# Patient Record
Sex: MALE | Race: WHITE | Employment: OTHER | URBAN - METROPOLITAN AREA
[De-identification: names, ages, dates, MRNs, and addresses within clinical notes are randomized per-mention and may not be internally consistent; named-entity substitution may affect disease eponyms.]

---

## 2021-05-27 PROBLEM — C43.59 MALIGNANT MELANOMA OF UPPER BACK (HCC): Status: ACTIVE | Noted: 2021-05-27

## 2021-06-01 ENCOUNTER — HOSPITAL ENCOUNTER (OUTPATIENT)
Dept: GENERAL RADIOLOGY | Age: 86
Discharge: HOME | End: 2021-06-01
Attending: SURGERY
Payer: MEDICARE

## 2021-06-01 ENCOUNTER — HOSPITAL ENCOUNTER (OUTPATIENT)
Dept: SURGERY | Age: 86
Discharge: HOME | End: 2021-06-01
Payer: MEDICARE

## 2021-06-01 VITALS
TEMPERATURE: 98.6 F | HEIGHT: 71 IN | HEART RATE: 62 BPM | DIASTOLIC BLOOD PRESSURE: 74 MMHG | SYSTOLIC BLOOD PRESSURE: 160 MMHG | OXYGEN SATURATION: 95 % | BODY MASS INDEX: 26.49 KG/M2 | WEIGHT: 189.2 LBS | RESPIRATION RATE: 18 BRPM

## 2021-06-01 LAB
ALBUMIN SERPL-MCNC: 3.3 G/DL (ref 3.2–4.6)
ALBUMIN/GLOB SERPL: 0.9 {RATIO} (ref 1.2–3.5)
ALP SERPL-CCNC: 96 U/L (ref 50–136)
ALT SERPL-CCNC: 26 U/L (ref 12–65)
ANION GAP SERPL CALC-SCNC: 5 MMOL/L (ref 7–16)
AST SERPL-CCNC: 15 U/L (ref 15–37)
ATRIAL RATE: 60 BPM
BASOPHILS # BLD: 0.1 K/UL (ref 0–0.2)
BASOPHILS NFR BLD: 1 % (ref 0–2)
BILIRUB SERPL-MCNC: 0.6 MG/DL (ref 0.2–1.1)
BUN SERPL-MCNC: 19 MG/DL (ref 8–23)
CALCIUM SERPL-MCNC: 8.9 MG/DL (ref 8.3–10.4)
CALCULATED R AXIS, ECG10: -38 DEGREES
CALCULATED T AXIS, ECG11: 57 DEGREES
CHLORIDE SERPL-SCNC: 106 MMOL/L (ref 98–107)
CO2 SERPL-SCNC: 28 MMOL/L (ref 21–32)
CREAT SERPL-MCNC: 0.87 MG/DL (ref 0.8–1.5)
DIAGNOSIS, 93000: NORMAL
DIFFERENTIAL METHOD BLD: ABNORMAL
EOSINOPHIL # BLD: 0.2 K/UL (ref 0–0.8)
EOSINOPHIL NFR BLD: 1 % (ref 0.5–7.8)
ERYTHROCYTE [DISTWIDTH] IN BLOOD BY AUTOMATED COUNT: 14.6 % (ref 11.9–14.6)
GLOBULIN SER CALC-MCNC: 3.6 G/DL (ref 2.3–3.5)
GLUCOSE SERPL-MCNC: 115 MG/DL (ref 65–100)
HCT VFR BLD AUTO: 41.2 % (ref 41.1–50.3)
HGB BLD-MCNC: 13.1 G/DL (ref 13.6–17.2)
IMM GRANULOCYTES # BLD AUTO: 0.1 K/UL (ref 0–0.5)
IMM GRANULOCYTES NFR BLD AUTO: 1 % (ref 0–5)
LYMPHOCYTES # BLD: 1.7 K/UL (ref 0.5–4.6)
LYMPHOCYTES NFR BLD: 14 % (ref 13–44)
MCH RBC QN AUTO: 30.1 PG (ref 26.1–32.9)
MCHC RBC AUTO-ENTMCNC: 31.8 G/DL (ref 31.4–35)
MCV RBC AUTO: 94.7 FL (ref 79.6–97.8)
MONOCYTES # BLD: 1.1 K/UL (ref 0.1–1.3)
MONOCYTES NFR BLD: 9 % (ref 4–12)
NEUTS SEG # BLD: 9.2 K/UL (ref 1.7–8.2)
NEUTS SEG NFR BLD: 74 % (ref 43–78)
NRBC # BLD: 0 K/UL (ref 0–0.2)
P-R INTERVAL, ECG05: 192 MS
PLATELET # BLD AUTO: 210 K/UL (ref 150–450)
PMV BLD AUTO: 9.9 FL (ref 9.4–12.3)
POTASSIUM SERPL-SCNC: 3.7 MMOL/L (ref 3.5–5.1)
PROT SERPL-MCNC: 6.9 G/DL (ref 6.3–8.2)
Q-T INTERVAL, ECG07: 462 MS
QRS DURATION, ECG06: 164 MS
QTC CALCULATION (BEZET), ECG08: 462 MS
RBC # BLD AUTO: 4.35 M/UL (ref 4.23–5.6)
SODIUM SERPL-SCNC: 139 MMOL/L (ref 138–145)
VENTRICULAR RATE, ECG03: 60 BPM
WBC # BLD AUTO: 12.4 K/UL (ref 4.3–11.1)

## 2021-06-01 PROCEDURE — 93005 ELECTROCARDIOGRAM TRACING: CPT | Performed by: ANESTHESIOLOGY

## 2021-06-01 PROCEDURE — 71046 X-RAY EXAM CHEST 2 VIEWS: CPT

## 2021-06-01 PROCEDURE — 80053 COMPREHEN METABOLIC PANEL: CPT

## 2021-06-01 PROCEDURE — 85025 COMPLETE CBC W/AUTO DIFF WBC: CPT

## 2021-06-01 RX ORDER — PREDNISONE 1 MG/1
4 TABLET ORAL
COMMUNITY

## 2021-06-01 RX ORDER — ASPIRIN 81 MG/1
81 TABLET ORAL DAILY
COMMUNITY

## 2021-06-01 RX ORDER — ACETAMINOPHEN 500 MG
1000 TABLET ORAL
COMMUNITY

## 2021-06-01 NOTE — PERIOP NOTES
PLEASE CONTINUE TAKING ALL PRESCRIPTION MEDICATIONS UP TO THE DAY OF SURGERY UNLESS OTHERWISE DIRECTED BELOW. DISCONTINUE all vitamins and supplements now for surgery. DISCONTINUE Non-Steriodal Anti-Inflammatory (NSAIDS) such as Advil and Aleve now for  surgery. Home Medications to take  the day of surgery    fexofenadine- allegra   Albuterol inhaler use if needed and bring to the hospital   Metoprolol    Prednisone    Aspirin 81 mg    Esomeprazole- nexium    Sertraline-zoloft     Home Medications   to Hold   Vitamins, Supplements, and Herbals. Non-Steriodal Anti-Inflammatory (NSAIDS) such as Advil and Aleve. Hold the morning of surgery cozaar (losartan),      Hold eliquis as instructed by surgeon and cardiologist     Comments      On the day before surgery please take Acetaminophen 1000mg in the morning and then again before bed. You may substitute for Tylenol 650 mg. Please do not bring home medications with you on the day of surgery unless otherwise directed by your nurse. If you are instructed to bring home medications, please give them to your nurse as they will be administered by the nursing staff. If you have any questions, please call [facility] Traci Kelly [phone] or [facility] [phone]. A copy of this note was provided to the patient for reference.

## 2021-06-01 NOTE — PERIOP NOTES
Recent Results (from the past 12 hour(s))   CBC WITH AUTOMATED DIFF    Collection Time: 06/01/21 11:35 AM   Result Value Ref Range    WBC 12.4 (H) 4.3 - 11.1 K/uL    RBC 4.35 4.23 - 5.6 M/uL    HGB 13.1 (L) 13.6 - 17.2 g/dL    HCT 41.2 41.1 - 50.3 %    MCV 94.7 79.6 - 97.8 FL    MCH 30.1 26.1 - 32.9 PG    MCHC 31.8 31.4 - 35.0 g/dL    RDW 14.6 11.9 - 14.6 %    PLATELET 642 907 - 495 K/uL    MPV 9.9 9.4 - 12.3 FL    ABSOLUTE NRBC 0.00 0.0 - 0.2 K/uL    DF AUTOMATED      NEUTROPHILS 74 43 - 78 %    LYMPHOCYTES 14 13 - 44 %    MONOCYTES 9 4.0 - 12.0 %    EOSINOPHILS 1 0.5 - 7.8 %    BASOPHILS 1 0.0 - 2.0 %    IMMATURE GRANULOCYTES 1 0.0 - 5.0 %    ABS. NEUTROPHILS 9.2 (H) 1.7 - 8.2 K/UL    ABS. LYMPHOCYTES 1.7 0.5 - 4.6 K/UL    ABS. MONOCYTES 1.1 0.1 - 1.3 K/UL    ABS. EOSINOPHILS 0.2 0.0 - 0.8 K/UL    ABS. BASOPHILS 0.1 0.0 - 0.2 K/UL    ABS. IMM. GRANS. 0.1 0.0 - 0.5 K/UL   METABOLIC PANEL, COMPREHENSIVE    Collection Time: 06/01/21 11:35 AM   Result Value Ref Range    Sodium 139 138 - 145 mmol/L    Potassium 3.7 3.5 - 5.1 mmol/L    Chloride 106 98 - 107 mmol/L    CO2 28 21 - 32 mmol/L    Anion gap 5 (L) 7 - 16 mmol/L    Glucose 115 (H) 65 - 100 mg/dL    BUN 19 8 - 23 MG/DL    Creatinine 0.87 0.8 - 1.5 MG/DL    GFR est AA >60 >60 ml/min/1.73m2    GFR est non-AA >60 >60 ml/min/1.73m2    Calcium 8.9 8.3 - 10.4 MG/DL    Bilirubin, total 0.6 0.2 - 1.1 MG/DL    ALT (SGPT) 26 12 - 65 U/L    AST (SGOT) 15 15 - 37 U/L    Alk.  phosphatase 96 50 - 136 U/L    Protein, total 6.9 6.3 - 8.2 g/dL    Albumin 3.3 3.2 - 4.6 g/dL    Globulin 3.6 (H) 2.3 - 3.5 g/dL    A-G Ratio 0.9 (L) 1.2 - 3.5     EKG, 12 LEAD, INITIAL    Collection Time: 06/01/21 11:53 AM   Result Value Ref Range    Ventricular Rate 60 BPM    Atrial Rate 60 BPM    P-R Interval 192 ms    QRS Duration 164 ms    Q-T Interval 462 ms    QTC Calculation (Bezet) 462 ms    Calculated R Axis -38 degrees    Calculated T Axis 57 degrees    Diagnosis       AV dual-paced rhythm  Abnormal ECG  When compared with ECG of 26-OCT-2007 13:45,  Electronic ventricular pacemaker has replaced Sinus rhythm       Reviewed.   CXR: impression- no evidence of metastatic disease in chest.  [facility], WBC 12.4

## 2021-06-01 NOTE — PERIOP NOTES
Vanna in scheduling notified to add pacemaker to case posting. Records received from [facility]. Dr. Zayda Whatley notified of procedure;  pacemaker interrogation 1/19/2021- [facility]emaker for sick sinus rhythm, not pacer dependent; history of a AVR and CABG 2012. Per Dr. Zayda Whatley a pacer rep is not needed.

## 2021-06-01 NOTE — PERIOP NOTES
Patient confirms name and . Order to obtain consent  found in EHR and  matches case posting. Pt verbalizes understanding of 1 visitor policy. Type 2 surgery, walk-in PAT assessment complete. Labs per surgeon: CBC, CMP. CXR, patient's ID bracelet applied, patient was given the order for the CXR and instructed to go to the radiology department and have CXR completed before leaving the hospital today. Labs per anesthesia protocol: Hgb included in CBC  EK21   Glucose: not indicated  Echo: 2019  EF 55-65%    Patient has a [address] pacemaker, records requested from [facility]    Covid test not ordered. Patient completed Denise Ding 2nd dose 21, covid vaccine card scanned under media tab    Medication list  visualized today. Antibacterial soap and Hibiclens and instructions given per hospital policy. Use in shower the night before surgery and on the morning of surgery. Patient provided with and instructed on educational handouts including Guide to Surgery, Pain Management, Hand Hygiene, Blood Transfusion Education, and [facility] Brochure. Medication instructions provided per PTA medication instruction note. Patient provided with a copy. Patient and his wife stated that they did not receive instructions about when to hold eliquis. Message left for Justyna at Dr. Alfredo Fox office to please contact the patient regarding eliquis. Update medication clearance (Dr. Dee Dee Dorsey)  for eliquis in EHR. Patient answered medical/surgical history questions at their best of ability. All prior to admission medications documented in [facility]. Patient instructed on the following:  Arrive at main Entrance, time of arrival to be called the day before by 1700  NPO after midnight including gum, mints, and ice chips. Responsible adult must drive patient to the hospital, stay during surgery, and patient will require supervision 24 hours after anesthesia.  If admitted to hospital, current visiting 6:30 a.m. - 9 p.m. for adult visitors. Leave all valuables (money and jewelry) at home but bring insurance card and ID on DOS. Do not wear make-up, nail polish, lotions, cologne, perfumes, powders, or oil on skin. Patient teach back successful and patient demonstrates knowledge of instruction.

## 2021-06-03 ENCOUNTER — ANESTHESIA EVENT (OUTPATIENT)
Dept: SURGERY | Age: 86
End: 2021-06-03
Payer: MEDICARE

## 2021-06-03 ENCOUNTER — HOSPITAL ENCOUNTER (OUTPATIENT)
Dept: NUCLEAR MEDICINE | Age: 86
Discharge: HOME | End: 2021-06-03
Attending: SURGERY
Payer: MEDICARE

## 2021-06-03 DIAGNOSIS — C43.59 MALIGNANT MELANOMA OF UPPER BACK (HCC): ICD-10-CM

## 2021-06-03 PROCEDURE — 78195 LYMPH SYSTEM IMAGING: CPT

## 2021-06-03 NOTE — ANESTHESIA PREPROCEDURE EVALUATION
Relevant Problems   RESPIRATORY SYSTEM   (+) Chronic obstructive pulmonary disease (HCC)      CARDIOVASCULAR   (+) Hypertension   (+) Presence of cardiac pacemaker   (+) Sick sinus syndrome (HCC)      PERSONAL HX & FAMILY HX OF CANCER   (+) Malignant melanoma of upper back (HCC)       Anesthetic History               Review of Systems / Medical History  Patient summary reviewed and pertinent labs reviewed    Pulmonary    COPD        Asthma : well controlled       Neuro/Psych              Cardiovascular    Hypertension        Dysrhythmias (SSS, Afib)   Pacemaker and CAD    Exercise tolerance: >4 METS  Comments: Pacemaker DDDR  Echo: Preserved EF, Bioprosthetic AV wnl   GI/Hepatic/Renal  Within defined limits              Endo/Other        Arthritis     Other Findings            Physical Exam    Airway  Mallampati: II  TM Distance: 4 - 6 cm  Neck ROM: normal range of motion   Mouth opening: Normal     Cardiovascular    Rhythm: regular  Rate: normal         Dental  No notable dental hx       Pulmonary  Breath sounds clear to auscultation               Abdominal  GI exam deferred       Other Findings            Anesthetic Plan    ASA: 3  Anesthesia type: general          Induction: Intravenous  Anesthetic plan and risks discussed with: Patient

## 2021-06-04 ENCOUNTER — APPOINTMENT (OUTPATIENT)
Dept: NUCLEAR MEDICINE | Age: 86
End: 2021-06-04
Attending: SURGERY
Payer: MEDICARE

## 2021-06-04 ENCOUNTER — ANESTHESIA (OUTPATIENT)
Dept: SURGERY | Age: 86
End: 2021-06-04
Payer: MEDICARE

## 2021-06-04 ENCOUNTER — HOSPITAL ENCOUNTER (OUTPATIENT)
Age: 86
Setting detail: OUTPATIENT SURGERY
Discharge: HOME | End: 2021-06-04
Attending: SURGERY | Admitting: SURGERY
Payer: MEDICARE

## 2021-06-04 VITALS
OXYGEN SATURATION: 92 % | BODY MASS INDEX: 27.11 KG/M2 | HEIGHT: 70 IN | WEIGHT: 189.4 LBS | TEMPERATURE: 98.2 F | HEART RATE: 63 BPM | DIASTOLIC BLOOD PRESSURE: 72 MMHG | RESPIRATION RATE: 16 BRPM | SYSTOLIC BLOOD PRESSURE: 189 MMHG

## 2021-06-04 DIAGNOSIS — C43.59 MALIGNANT MELANOMA OF UPPER BACK (HCC): Primary | ICD-10-CM

## 2021-06-04 PROCEDURE — 77030006627 HC BLD GRFT DRMTO ZIMM -B: Performed by: SURGERY

## 2021-06-04 PROCEDURE — 88305 TISSUE EXAM BY PATHOLOGIST: CPT

## 2021-06-04 PROCEDURE — 77030019908 HC STETH ESOPH SIMS -A: Performed by: STUDENT IN AN ORGANIZED HEALTH CARE EDUCATION/TRAINING PROGRAM

## 2021-06-04 PROCEDURE — 21936 RESECT BACK TUM 5 CM/>: CPT | Performed by: SURGERY

## 2021-06-04 PROCEDURE — 88307 TISSUE EXAM BY PATHOLOGIST: CPT

## 2021-06-04 PROCEDURE — 38900 IO MAP OF SENT LYMPH NODE: CPT | Performed by: SURGERY

## 2021-06-04 PROCEDURE — 38525 BIOPSY/REMOVAL LYMPH NODES: CPT | Performed by: SURGERY

## 2021-06-04 PROCEDURE — 74011000250 HC RX REV CODE- 250: Performed by: STUDENT IN AN ORGANIZED HEALTH CARE EDUCATION/TRAINING PROGRAM

## 2021-06-04 PROCEDURE — 77030002916 HC SUT ETHLN J&J -A: Performed by: SURGERY

## 2021-06-04 PROCEDURE — 77030003029 HC SUT VCRL J&J -B: Performed by: SURGERY

## 2021-06-04 PROCEDURE — 77030011256 HC DRSG MEPILEX <16IN NO BORD MOLN -A: Performed by: SURGERY

## 2021-06-04 PROCEDURE — 76060000034 HC ANESTHESIA 1.5 TO 2 HR: Performed by: SURGERY

## 2021-06-04 PROCEDURE — 74011250636 HC RX REV CODE- 250/636: Performed by: ANESTHESIOLOGY

## 2021-06-04 PROCEDURE — 74011000250 HC RX REV CODE- 250: Performed by: SURGERY

## 2021-06-04 PROCEDURE — 76010000153 HC OR TIME 1.5 TO 2 HR: Performed by: SURGERY

## 2021-06-04 PROCEDURE — 77030039425 HC BLD LARYNG TRULITE DISP TELE -A: Performed by: STUDENT IN AN ORGANIZED HEALTH CARE EDUCATION/TRAINING PROGRAM

## 2021-06-04 PROCEDURE — 77030037088 HC TUBE ENDOTRACH ORAL NSL COVD-A: Performed by: STUDENT IN AN ORGANIZED HEALTH CARE EDUCATION/TRAINING PROGRAM

## 2021-06-04 PROCEDURE — 76210000063 HC OR PH I REC FIRST 0.5 HR: Performed by: SURGERY

## 2021-06-04 PROCEDURE — 77030008462 HC STPLR SKN PROX J&J -A: Performed by: SURGERY

## 2021-06-04 PROCEDURE — 74011250636 HC RX REV CODE- 250/636: Performed by: SURGERY

## 2021-06-04 PROCEDURE — 76210000026 HC REC RM PH II 1 TO 1.5 HR: Performed by: SURGERY

## 2021-06-04 PROCEDURE — 2709999900 HC NON-CHARGEABLE SUPPLY: Performed by: SURGERY

## 2021-06-04 PROCEDURE — 74011250636 HC RX REV CODE- 250/636: Performed by: STUDENT IN AN ORGANIZED HEALTH CARE EDUCATION/TRAINING PROGRAM

## 2021-06-04 PROCEDURE — 77030010512 HC APPL CLP LIG J&J -C: Performed by: SURGERY

## 2021-06-04 PROCEDURE — 77030040922 HC BLNKT HYPOTHRM STRY -A: Performed by: STUDENT IN AN ORGANIZED HEALTH CARE EDUCATION/TRAINING PROGRAM

## 2021-06-04 RX ORDER — DIPHENHYDRAMINE HYDROCHLORIDE 50 MG/ML
12.5 INJECTION, SOLUTION INTRAMUSCULAR; INTRAVENOUS ONCE
Status: DISCONTINUED | OUTPATIENT
Start: 2021-06-04 | End: 2021-06-04 | Stop reason: HOSPADM

## 2021-06-04 RX ORDER — FENTANYL CITRATE 50 UG/ML
INJECTION, SOLUTION INTRAMUSCULAR; INTRAVENOUS AS NEEDED
Status: DISCONTINUED | OUTPATIENT
Start: 2021-06-04 | End: 2021-06-04 | Stop reason: HOSPADM

## 2021-06-04 RX ORDER — ACETAMINOPHEN 500 MG
1000 TABLET ORAL ONCE
Status: DISCONTINUED | OUTPATIENT
Start: 2021-06-04 | End: 2021-06-04 | Stop reason: HOSPADM

## 2021-06-04 RX ORDER — DEXAMETHASONE SODIUM PHOSPHATE 4 MG/ML
INJECTION, SOLUTION INTRA-ARTICULAR; INTRALESIONAL; INTRAMUSCULAR; INTRAVENOUS; SOFT TISSUE AS NEEDED
Status: DISCONTINUED | OUTPATIENT
Start: 2021-06-04 | End: 2021-06-04 | Stop reason: HOSPADM

## 2021-06-04 RX ORDER — NALOXONE HYDROCHLORIDE 0.4 MG/ML
0.1 INJECTION, SOLUTION INTRAMUSCULAR; INTRAVENOUS; SUBCUTANEOUS AS NEEDED
Status: DISCONTINUED | OUTPATIENT
Start: 2021-06-04 | End: 2021-06-04 | Stop reason: HOSPADM

## 2021-06-04 RX ORDER — FENTANYL CITRATE 50 UG/ML
100 INJECTION, SOLUTION INTRAMUSCULAR; INTRAVENOUS ONCE
Status: DISCONTINUED | OUTPATIENT
Start: 2021-06-04 | End: 2021-06-04 | Stop reason: HOSPADM

## 2021-06-04 RX ORDER — HYDROMORPHONE HYDROCHLORIDE 1 MG/ML
0.5 INJECTION, SOLUTION INTRAMUSCULAR; INTRAVENOUS; SUBCUTANEOUS
Status: DISCONTINUED | OUTPATIENT
Start: 2021-06-04 | End: 2021-06-04 | Stop reason: HOSPADM

## 2021-06-04 RX ORDER — SODIUM CHLORIDE, SODIUM LACTATE, POTASSIUM CHLORIDE, CALCIUM CHLORIDE 600; 310; 30; 20 MG/100ML; MG/100ML; MG/100ML; MG/100ML
100 INJECTION, SOLUTION INTRAVENOUS CONTINUOUS
Status: DISCONTINUED | OUTPATIENT
Start: 2021-06-04 | End: 2021-06-04 | Stop reason: HOSPADM

## 2021-06-04 RX ORDER — SODIUM CHLORIDE, SODIUM LACTATE, POTASSIUM CHLORIDE, CALCIUM CHLORIDE 600; 310; 30; 20 MG/100ML; MG/100ML; MG/100ML; MG/100ML
75 INJECTION, SOLUTION INTRAVENOUS CONTINUOUS
Status: DISCONTINUED | OUTPATIENT
Start: 2021-06-04 | End: 2021-06-04 | Stop reason: HOSPADM

## 2021-06-04 RX ORDER — ONDANSETRON 2 MG/ML
4 INJECTION INTRAMUSCULAR; INTRAVENOUS ONCE
Status: DISCONTINUED | OUTPATIENT
Start: 2021-06-04 | End: 2021-06-04 | Stop reason: HOSPADM

## 2021-06-04 RX ORDER — CEFAZOLIN SODIUM/WATER 2 G/20 ML
2 SYRINGE (ML) INTRAVENOUS ONCE
Status: COMPLETED | OUTPATIENT
Start: 2021-06-04 | End: 2021-06-04

## 2021-06-04 RX ORDER — LIDOCAINE HYDROCHLORIDE 10 MG/ML
0.1 INJECTION INFILTRATION; PERINEURAL AS NEEDED
Status: DISCONTINUED | OUTPATIENT
Start: 2021-06-04 | End: 2021-06-04 | Stop reason: HOSPADM

## 2021-06-04 RX ORDER — OXYCODONE HYDROCHLORIDE 5 MG/1
5 TABLET ORAL
Status: DISCONTINUED | OUTPATIENT
Start: 2021-06-04 | End: 2021-06-04 | Stop reason: HOSPADM

## 2021-06-04 RX ORDER — HYDROCODONE BITARTRATE AND ACETAMINOPHEN 5; 325 MG/1; MG/1
1-2 TABLET ORAL
Qty: 28 TABLET | Refills: 0 | Status: SHIPPED | OUTPATIENT
Start: 2021-06-04 | End: 2021-06-11

## 2021-06-04 RX ORDER — PROPOFOL 10 MG/ML
INJECTION, EMULSION INTRAVENOUS AS NEEDED
Status: DISCONTINUED | OUTPATIENT
Start: 2021-06-04 | End: 2021-06-04 | Stop reason: HOSPADM

## 2021-06-04 RX ORDER — ONDANSETRON 2 MG/ML
INJECTION INTRAMUSCULAR; INTRAVENOUS AS NEEDED
Status: DISCONTINUED | OUTPATIENT
Start: 2021-06-04 | End: 2021-06-04 | Stop reason: HOSPADM

## 2021-06-04 RX ORDER — OXYCODONE HYDROCHLORIDE 5 MG/1
10 TABLET ORAL
Status: DISCONTINUED | OUTPATIENT
Start: 2021-06-04 | End: 2021-06-04 | Stop reason: HOSPADM

## 2021-06-04 RX ORDER — SODIUM CHLORIDE 0.9 % (FLUSH) 0.9 %
5-40 SYRINGE (ML) INJECTION AS NEEDED
Status: DISCONTINUED | OUTPATIENT
Start: 2021-06-04 | End: 2021-06-04 | Stop reason: HOSPADM

## 2021-06-04 RX ORDER — GLYCOPYRROLATE 0.2 MG/ML
INJECTION INTRAMUSCULAR; INTRAVENOUS AS NEEDED
Status: DISCONTINUED | OUTPATIENT
Start: 2021-06-04 | End: 2021-06-04 | Stop reason: HOSPADM

## 2021-06-04 RX ORDER — BUPIVACAINE HYDROCHLORIDE 5 MG/ML
INJECTION, SOLUTION EPIDURAL; INTRACAUDAL AS NEEDED
Status: DISCONTINUED | OUTPATIENT
Start: 2021-06-04 | End: 2021-06-04 | Stop reason: HOSPADM

## 2021-06-04 RX ORDER — NEOSTIGMINE METHYLSULFATE 1 MG/ML
INJECTION, SOLUTION INTRAVENOUS AS NEEDED
Status: DISCONTINUED | OUTPATIENT
Start: 2021-06-04 | End: 2021-06-04 | Stop reason: HOSPADM

## 2021-06-04 RX ORDER — MIDAZOLAM HYDROCHLORIDE 1 MG/ML
2 INJECTION, SOLUTION INTRAMUSCULAR; INTRAVENOUS
Status: DISCONTINUED | OUTPATIENT
Start: 2021-06-04 | End: 2021-06-04 | Stop reason: HOSPADM

## 2021-06-04 RX ORDER — ROCURONIUM BROMIDE 10 MG/ML
INJECTION, SOLUTION INTRAVENOUS AS NEEDED
Status: DISCONTINUED | OUTPATIENT
Start: 2021-06-04 | End: 2021-06-04 | Stop reason: HOSPADM

## 2021-06-04 RX ORDER — MIDAZOLAM HYDROCHLORIDE 1 MG/ML
2 INJECTION, SOLUTION INTRAMUSCULAR; INTRAVENOUS ONCE
Status: DISCONTINUED | OUTPATIENT
Start: 2021-06-04 | End: 2021-06-04 | Stop reason: HOSPADM

## 2021-06-04 RX ORDER — LIDOCAINE HYDROCHLORIDE 20 MG/ML
INJECTION, SOLUTION EPIDURAL; INFILTRATION; INTRACAUDAL; PERINEURAL AS NEEDED
Status: DISCONTINUED | OUTPATIENT
Start: 2021-06-04 | End: 2021-06-04 | Stop reason: HOSPADM

## 2021-06-04 RX ORDER — SODIUM CHLORIDE 0.9 % (FLUSH) 0.9 %
5-40 SYRINGE (ML) INJECTION EVERY 8 HOURS
Status: DISCONTINUED | OUTPATIENT
Start: 2021-06-04 | End: 2021-06-04 | Stop reason: HOSPADM

## 2021-06-04 RX ADMIN — ROCURONIUM BROMIDE 10 MG: 10 INJECTION, SOLUTION INTRAVENOUS at 09:20

## 2021-06-04 RX ADMIN — LIDOCAINE HYDROCHLORIDE 40 MG: 20 INJECTION, SOLUTION EPIDURAL; INFILTRATION; INTRACAUDAL; PERINEURAL at 08:18

## 2021-06-04 RX ADMIN — Medication 4 MG: at 09:34

## 2021-06-04 RX ADMIN — SODIUM CHLORIDE, SODIUM LACTATE, POTASSIUM CHLORIDE, AND CALCIUM CHLORIDE 100 ML/HR: 600; 310; 30; 20 INJECTION, SOLUTION INTRAVENOUS at 05:52

## 2021-06-04 RX ADMIN — PHENYLEPHRINE HYDROCHLORIDE 120 MCG: 10 INJECTION INTRAVENOUS at 08:34

## 2021-06-04 RX ADMIN — GLYCOPYRROLATE 0.5 MG: 0.2 INJECTION, SOLUTION INTRAMUSCULAR; INTRAVENOUS at 09:34

## 2021-06-04 RX ADMIN — ROCURONIUM BROMIDE 40 MG: 10 INJECTION, SOLUTION INTRAVENOUS at 08:19

## 2021-06-04 RX ADMIN — ROCURONIUM BROMIDE 10 MG: 10 INJECTION, SOLUTION INTRAVENOUS at 08:50

## 2021-06-04 RX ADMIN — DEXAMETHASONE SODIUM PHOSPHATE 4 MG: 4 INJECTION, SOLUTION INTRAMUSCULAR; INTRAVENOUS at 08:29

## 2021-06-04 RX ADMIN — CEFAZOLIN 2 G: 1 INJECTION, POWDER, FOR SOLUTION INTRAVENOUS at 08:19

## 2021-06-04 RX ADMIN — FENTANYL CITRATE 50 MCG: 50 INJECTION INTRAMUSCULAR; INTRAVENOUS at 08:18

## 2021-06-04 RX ADMIN — PROPOFOL 150 MG: 10 INJECTION, EMULSION INTRAVENOUS at 08:18

## 2021-06-04 RX ADMIN — FENTANYL CITRATE 25 MCG: 50 INJECTION INTRAMUSCULAR; INTRAVENOUS at 09:25

## 2021-06-04 RX ADMIN — FENTANYL CITRATE 25 MCG: 50 INJECTION INTRAMUSCULAR; INTRAVENOUS at 08:50

## 2021-06-04 RX ADMIN — PHENYLEPHRINE HYDROCHLORIDE 120 MCG: 10 INJECTION INTRAVENOUS at 09:01

## 2021-06-04 RX ADMIN — FENTANYL CITRATE 25 MCG: 50 INJECTION INTRAMUSCULAR; INTRAVENOUS at 08:51

## 2021-06-04 RX ADMIN — ONDANSETRON 4 MG: 2 INJECTION INTRAMUSCULAR; INTRAVENOUS at 09:29

## 2021-06-04 NOTE — DISCHARGE INSTRUCTIONS
OK Leave dressings alone for 7-10 days (or until your follow-up appt if they stay dry underneath the outer tape). If they are removed, replace them every 1-2 days with dry gauze and tape until follow-up  Try to keep as dry as possible to lower risk of infection. No heavy lifting (>10lbs) for 4 weeks. No driving until you are off pain meds for 24hrs and have no pain with movements associated with driving. Pain prescription (Norco) electronically sent to your pharmacy  Follow-up with Dr Paz Vazquez in 13 days on a Thursday in the office at:  [address], [address]  (Call for an appt time unless one is already made for you by discharge nurse which is preferred->[phone]-->option 1)    MEDICATION INTERACTION:  During your procedure you potentially received a medication or medications which may reduce the effectiveness of oral contraceptives. Please consider other forms of contraception for 1 month following your procedure if you are currently using oral contraceptives as your primary form of birth control. In addition to this, we recommend continuing your oral contraceptive as prescribed, unless otherwise instructed by your physician, during this time    After general anesthesia or intravenous sedation, for 24 hours or while taking prescription Narcotics:  · Limit your activities  · A responsible adult needs to be with you for the next 24 hours  · Do not drive and operate hazardous machinery  · Do not make important personal or business decisions  · Do not drink alcoholic beverages  · If you have not urinated within 8 hours after discharge, and you are experiencing discomfort from urinary retention, please go to the nearest ED. · If you have sleep apnea and have a CPAP machine, please use it for all naps and sleeping. · Please use caution when taking narcotics and any of your home medications that may cause drowsiness.   *  Please give a list of your current medications to your Primary Care Provider. *  Please update this list whenever your medications are discontinued, doses are      changed, or new medications (including over-the-counter products) are added. *  Please carry medication information at all times in case of emergency situations. These are general instructions for a healthy lifestyle:  No smoking/ No tobacco products/ Avoid exposure to second hand smoke  Surgeon General's Warning:  Quitting smoking now greatly reduces serious risk to your health. Obesity, smoking, and sedentary lifestyle greatly increases your risk for illness  A healthy diet, regular physical exercise & weight monitoring are important for maintaining a healthy lifestyle    You may be retaining fluid if you have a history of heart failure or if you experience any of the following symptoms:  Weight gain of 3 pounds or more overnight or 5 pounds in a week, increased swelling in our hands or feet or shortness of breath while lying flat in bed. Please call your doctor as soon as you notice any of these symptoms; do not wait until your next office visit.

## 2021-06-04 NOTE — ANESTHESIA POSTPROCEDURE EVALUATION
Procedure(s):  EXCISION OF MELONOMA OF BACK  SENTINEL NODE BIOPSY OF MELONOMA OF BACK. general    Anesthesia Post Evaluation      Multimodal analgesia: multimodal analgesia used between 6 hours prior to anesthesia start to PACU discharge  Patient location during evaluation: bedside  Patient participation: complete - patient participated  Level of consciousness: responsive to verbal stimuli  Pain management: adequate  Airway patency: patent  Anesthetic complications: no  Cardiovascular status: hemodynamically stable  Respiratory status: spontaneous ventilation  Hydration status: stable    Final Post Anesthesia Temperature Assessment:  Normothermia (36.0-37.5 degrees C)      INITIAL Post-op Vital signs:   Vitals Value Taken Time   /79 06/04/21 1034   Temp 36.3 °C (97.4 °F) 06/04/21 0955   Pulse 61 06/04/21 1036   Resp 11 06/04/21 0955   SpO2 98 % 06/04/21 1036   Vitals shown include unvalidated device data.

## 2021-06-04 NOTE — H&P
H&P/Consult Note/Progress Note/Office Note:   Juan Francisco Conrad  MRN: [id number]  :1935  Age:85 y.o.    HPI: Juan Francisco Conrad is a 80 y.o. male who is here for [facility] and sent node excision of a left posterior back pT2a invasive melanoma. He was diagnosed by shave biopsy on 21 by Dr Reagan Chavez which is shown below. Invasive melanoma (Breslow 1.3mm at least, IV, 8 mitosis/sqmm, no ulceration, regression, microsatellitosis, LVI)  He denies weight loss, hemoptysis, frequent respiratory infections, headaches, seizures, lumps/bumps, etc.    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 21 LFTs (pre-op) normal  21 CXR (pre-op), 2 views  The lungs are clear. There are no infiltrates or effusions. There is stable mild cardiomegaly. Sternotomy changes are present.     There is a pacemaker in place.       IMPRESSION  No evidence of metastatic disease in the chest.            Past Medical History:   Diagnosis Date    Acid reflux     Allergic rhinitis     Anal fissure     Arthritis     Asthma     controlled w daily inhalers    Atrial fibrillation (HCC)     on eliquis    CAD (coronary artery disease)     CABG with aortic valve replacement     Cancer ([address]     Skin CA removed    Chronic obstructive pulmonary disease (COPD) ([address]     controlled w daily inhalers    Chronic sinusitis     COVID-19 vaccine series completed 2021    Moderna 2nd dose 21    High cholesterol     High frequency hearing loss     History of nasal polyp     Hypertension     medication    Melanoma ([address] 2021    back    Nasal obstruction     Pacemaker 2012    Peripheral vertigo     Sick sinus syndrome ([address] 12/10/2015    pacemaker  [facility]     SNHL (sensorineural hearing loss)      Past Surgical History:   Procedure Laterality Date    HX AORTIC VALVE REPLACEMENT  12    Aortic valve replacement and CABG3    HX BACK SURGERY      HX CATARACT REMOVAL      HX CHOLECYSTECTOMY      HX CORONARY STENT PLACEMENT  2009    Heart stent    HX PACEMAKER  2012    [facility]     HX SEPTOPLASTY      Deviated Septum Repair     Current Facility-Administered Medications   Medication Dose Route Frequency    lidocaine (XYLOCAINE) 10 mg/mL (1 %) injection 0.1 mL  0.1 mL SubCUTAneous PRN    lactated Ringers infusion  100 mL/hr IntraVENous CONTINUOUS    acetaminophen (TYLENOL) tablet 1,000 mg  1,000 mg Oral ONCE    fentaNYL citrate (PF) injection 100 mcg  100 mcg IntraVENous ONCE    midazolam (VERSED) injection 2 mg  2 mg IntraVENous ONCE PRN    midazolam (VERSED) injection 2 mg  2 mg IntraVENous ONCE    ceFAZolin (ANCEF) 2 g/20 mL in sterile water IV syringe  2 g IntraVENous ONCE    sodium chloride (NS) flush 5-40 mL  5-40 mL IntraVENous Q8H    sodium chloride (NS) flush 5-40 mL  5-40 mL IntraVENous PRN     Other plant, animal, environmental  Social History     Socioeconomic History    Marital status:      Spouse name: Not on file    Number of children: Not on file    Years of education: Not on file    Highest education level: Not on file   Tobacco Use    Smoking status: Former Smoker     Packs/day: 1.00     Years: 15.00     Pack years: 15.00     Quit date: 1965     Years since quittin.3    Smokeless tobacco: Never Used   Vaping Use    Vaping Use: Never used   Substance and Sexual Activity    Alcohol use: No    Drug use: No     Social Determinants of Health     Financial Resource Strain:     Difficulty of Paying Living Expenses:    Food Insecurity:     Worried About Running Out of Food in the Last Year:     Ran Out of Food in the Last Year:    Transportation Needs:     Lack of Transportation (Medical):      Lack of Transportation (Non-Medical):    Physical Activity:     Days of Exercise per Week:     Minutes of Exercise per Session:    Stress:     Feeling of Stress :    Social Connections:  Frequency of Communication with Friends and Family:     Frequency of Social Gatherings with Friends and Family:     Attends Jainism Services:     Active Member of Clubs or Organizations:     Attends Club or Organization Meetings:     Marital Status:      Social History     Tobacco Use   Smoking Status Former Smoker    Packs/day: 1.00    Years: 15.00    Pack years: 15.00    Quit date: 1965    Years since quittin.4   Smokeless Tobacco Never Used     Family History   Problem Relation Age of Onset    Heart Disease Mother     Stroke Father     Heart Disease Sister     Hypertension Brother     Hypertension Brother      ROS: The patient has no difficulty with chest pain or shortness of breath. No fever or chills. Comprehensive review of systems was otherwise unremarkable except as noted above. Physical Exam:   Visit Vitals  BP (!) 190/84   Pulse 60   Temp 98.8 °F (37.1 °C)   Resp 16   Ht 5' 10\" (1.778 m)   Wt 189 lb 6.4 oz (85.9 kg)   SpO2 95%   BMI 27.18 kg/m²     Vitals:    21 0536 21 0548   BP:  (!) 190/84   Pulse:  60   Resp:  16   Temp:  98.8 °F (37.1 °C)   SpO2:  95%   Weight: 189 lb 6.4 oz (85.9 kg)    Height: 5' 10\" (1.778 m)      @[facility]@  [unfilled]    Constitutional: Alert, oriented, cooperative patient in no acute distress; appears stated age    Eyes:Sclera are clear. EOMs intact  ENMT: no external lesions gross hearing normal; no obvious neck masses, no ear or lip lesions, nares normal  CV: RRR. Normal perfusion  Resp: No JVD. Breathing is  non-labored; no audible wheezing. Healing shave biopsy site left upper back/shoulder region  No satellite lesions  No regional adenopathy. GI: soft and non-distended     Musculoskeletal: unremarkable with normal function. No embolic signs or cyanosis.    Neuro:  Oriented; moves all 4; no focal deficits  Psychiatric: normal affect and mood, no memory impairment    Recent vitals (if inpt): @IPVITALS(24:)@    Amount and/or Complexity of Data Reviewed and Analyzed:  I reviewed and analyzed all of the unique labs and radiologic studies that are shown below as well as any that are in the HPI, and any that are in the expanded problem list below  *Each unique test, order, or document contributes to the combination of 2 or combination of 3 in Category 1 below. For this visit I also reviewed old records and prior notes. Recent Labs     06/01/21  1135   WBC 12.4*   HGB 13.1*         K 3.7      CO2 28   BUN 19   CREA 0.87   *   TBILI 0.6   ALT 26   AP 96     Review of most recent CBC  Lab Results   Component Value Date/Time    WBC 12.4 (H) 06/01/2021 11:35 AM    HGB 13.1 (L) 06/01/2021 11:35 AM    HCT 41.2 06/01/2021 11:35 AM    PLATELET 550 79/77/4979 11:35 AM    MCV 94.7 06/01/2021 11:35 AM       Review of most recent BMP  Lab Results   Component Value Date/Time    Sodium 139 06/01/2021 11:35 AM    Potassium 3.7 06/01/2021 11:35 AM    Chloride 106 06/01/2021 11:35 AM    CO2 28 06/01/2021 11:35 AM    Anion gap 5 (L) 06/01/2021 11:35 AM    Glucose 115 (H) 06/01/2021 11:35 AM    BUN 19 06/01/2021 11:35 AM    Creatinine 0.87 06/01/2021 11:35 AM    GFR est AA >60 06/01/2021 11:35 AM    GFR est non-AA >60 06/01/2021 11:35 AM    Calcium 8.9 06/01/2021 11:35 AM       Review of most recent LFTs (and lipase if done)  Lab Results   Component Value Date/Time    ALT (SGPT) 26 06/01/2021 11:35 AM    AST (SGOT) 15 06/01/2021 11:35 AM    Alk.  phosphatase 96 06/01/2021 11:35 AM    Bilirubin, total 0.6 06/01/2021 11:35 AM     No results found for: LPSE    No results found for: INR, APTT, CBIL, LCAD, NH4, TROPT, TROIQ, INREXT, INREXT    Review of most recent HgbA1c  No results found for: HBA1C, MID9JGTG, KSL6ZVBP, QWE7NBUK    Nutritional assessment screen for wound healing issues:  Lab Results   Component Value Date/Time    Protein, total 6.9 06/01/2021 11:35 AM    Albumin 3.3 06/01/2021 11:35 AM       @lastcovr@  XR Results (most recent):  Results from Hospital Encounter encounter on 06/01/21    XR CHEST PA LAT    Narrative  Chest X-ray    INDICATION: Preop, melanoma    PA and lateral views of the chest were obtained. FINDINGS: The lungs are clear. There are no infiltrates or effusions. There is  stable mild cardiomegaly. Sternotomy changes are present. There is a pacemaker  in place. Impression  No evidence of metastatic disease in the chest.      CT Results (most recent):  Results from Hospital Encounter encounter on 08/01/13    CT ABD PELV W CONT    Narrative  CT abdomen and pelvis with IV contrast 08/01/2013    Indication: Perianal lesion    Comparison: 06/14/2013    Procedure: Oral contrast was used. Axial images through the abdomen and pelvis  were obtained after the intravenous administration of 125 cc Optiray-350. CT abdomen: There are stable linear scarring at the right lung lower lobe. There is a cardiac electronic device with leads present in the right atrium and  right ventricle. The gallbladder is surgically absent. The liver or splenic  lesions. The adrenal glands are normal. Pancreas is fatty replaced. Kidneys  enhance normally. No abdominal aortic aneurysm. The appendix is not clearly  seen but no secondary signs of appendicitis. No retroperitoneal or mesenteric  adenopathy. Large periampullary duodenal diverticulum. CT pelvis: Image 89 shows no significant interval change in the small nodular  density at the left perianal region measuring 12 mm with minimal surrounding  inflammatory change. This does not appear to communicate with the anus or  rectum. There are bilateral inguinal hernias, the hernia on the left contains  fat, the hernia on the right contains small bowel. Urinary bladder is normal.  Colonic diverticulosis without diverticulitis. Bone windows show no aggressive appearing skeletal lesions    Impression: Stable 1.2 cm left perianal lesion.  No evidence for metastatic  disease to the abdomen or pelvis or acute abnormality. US Results (most recent):  No results found for this or any previous visit. Admission date (for inpatients): 6/4/2021   * No surgery found *  Procedure(s):  EXCISION OF MELONOMA OF BACK  SPLIT THICKNESS SKIN GRAFT FROM EITHER THIGH TO BACK  SENTINEL NODE BIOPSY OF MELONA OF BACK PT HAS PACEMAKER DAY 1 / 6/3  LYMPHO @ 1400   DAY 2 /6/4 LYMPHO @ 0700        ASSESSMENT/PLAN:  Problem List  Date Reviewed: 5/27/2021        Codes Class Noted    Malignant melanoma of upper back ([facility] ICD-10-CM: C43.59  ICD-9-CM: 172.5  5/27/2021    Overview Signed 6/4/2021  5:49 AM by Consuelo Acuña MD     6/4/21 s/p WLE back melanoma and sent node excision; Dr Mode Ames             Allergic rhinitis ICD-10-CM: J30.9  ICD-9-CM: 477.9  Unknown        Peripheral vertigo ICD-10-CM: H81.399  ICD-9-CM: 386.10  Unknown        High frequency hearing loss ICD-10-CM: H91.90  ICD-9-CM: 389.8  Unknown        SNHL (sensorineural hearing loss) ICD-10-CM: H90.5  ICD-9-CM: 389.10  Unknown        Hypertension ICD-10-CM: I10  ICD-9-CM: 401.9  Unknown        History of nasal polyp ICD-10-CM: Z87.09  ICD-9-CM: V13.89  Unknown        High cholesterol ICD-10-CM: E78.00  ICD-9-CM: 272.0  Unknown        Chronic obstructive pulmonary disease ([facility] ICD-10-CM: J44.9  ICD-9-CM: 340  1/25/2016        Presence of cardiac pacemaker ICD-10-CM: Z95.0  ICD-9-CM: V45.01  12/10/2015        Sick sinus syndrome ([facility] ICD-10-CM: I49.5  ICD-9-CM: 427.81  12/10/2015            Active Problems:    * No active hospital problems. *         Number and Complexity of Problems addressed and   Risks of complications and/or morbidity of management      pT2aN0 left posterior upper back invasive melanoma   He is here for WLE and sent node excision of a left posterior back pT2a invasive melanoma. Pre-op CXR and LFTs normal  He has a pacemaker.     Long discussion with patient in the office  Informed consent was obtained for wide excision of the invasive melanoma site with possible split-thickness skin grafting from either thigh as well as sentinel node excision. Procedure risks discussed including recurrence, need for grafting, delayed wound healing, cardiac complications, etc.  All his questions were answered. He is in favor proceeding. We will schedule surgery for him soon off Eliquis for 2 days if okay with his prescribing physician              I have personally performed a face-to-face diagnostic evaluation and management  service on this patient. I have independently seen the patient. I have independently obtained the above history from the patient/family. I have independently examined the patient with above findings. I have independently reviewed data/labs for this patient and developed the above plan of care (MDM). Signed: Darnell Tom.  Paz Vazquez MD, FACS

## 2021-06-04 NOTE — OP NOTES
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 PROCEDURES PERFORMED:  1. Radical resection of invasive melanoma of the back, 7 cm (CPT 92884). 2.  Left axillary sentinel node excision following intraoperative mapping and identification (CPT 81530 -46, 71206+). SURGEON:  Praveen Hopkins. Shashank Vaughn MD    ASSISTANT:  None. ANESTHESIA:  General.    COMPLICATIONS:  None. SPECIMENS REMOVED:  Wide excision and left axillary sentinel node along with additional left axillary lymphatic tissue all sent to Pathology for review. IMPLANTS:  None. ESTIMATED BLOOD LOSS:  Less than 30 mL. OPERATIVE PROCEDURE:  The patient was taken to the operating room and placed in supine position. After adequate anesthesia was given, his left axilla was prepped and draped in sterile fashion with multiple layers of Betadine scrub and Betadine solution. Time-out protocol was followed. He was given prophylactic antibiotics. The Neoprobe was used to map our incision. We made an oblique incision in the left axilla. The Neoprobe was used to intraoperatively map and identify the left axillary sentinel node. The Neoprobe guided our dissection into the deep left axillary space. We encountered the anterior aspect of the sentinel node. The sentinel node was completely removed. Lymphatic and venous tributaries were clipped. It had a count of 250. It was sent to Pathology for review, marked left axillary sentinel node.   There was a small additional area of lymphatic tissue, which had a slight count of 35 and was devascularized enough just to remove this and sent it as a separate specimen. Following removal of this tissue, the background of the axilla was less than 2. Irrigation was used. Hemostasis was confirmed. There was no evidence of gross or palpable disease remaining. A local anesthetic was given. The incision was closed with interrupted deep dermal 3-0 Vicryl sutures. The skin was closed with clips. The patient was then repositioned prone. He was reprepped and redraped. The melanoma site involving his left upper back was identified. A 2-cm grossly negative margin was marked out around this site. This was extended in the medial and lateral direction along Mendoza's lines. This created a luann-shaped incision, which was 7 cm in length. A #15 blade was used to excise through the skin in full-thickness and then dissection was performed all the way through the subcutaneous adipose tissue all the way down to and including the thoracic fascia of the posterior trapezius. The wide radical resection was completed. A short suture was placed at the superior margin. A long suture was placed at the lateral margin. The specimen was sent to Pathology for review. Irrigation of the wide excision site was performed. There was no evidence of gross or palpable disease remaining. Hemostasis was confirmed. We were able to completely close the wound without the need for a skin graft using retention sutures of 2-0 nylon after a few interrupted deep dermal 3-0 Vicryl sutures were placed. Skin clips were placed between the retention sutures. A sterile dressing was placed consisting of 4x4s and Tegaderms after a local anesthetic was given. The patient tolerated the procedure well. There were no immediate complications.         Ishmael Myers MD [id number]  D:  06/04/2021 11:31  T:  06/04/2021 13:53  JOB #:  [id number]

## 2022-03-20 PROBLEM — C43.59 MALIGNANT MELANOMA OF UPPER BACK (HCC): Status: ACTIVE | Noted: 2021-05-27

## 2022-04-22 ENCOUNTER — HOSPITAL ENCOUNTER (OUTPATIENT)
Dept: PHYSICAL THERAPY | Age: 87
Discharge: HOME | End: 2022-04-22
Payer: MEDICARE

## 2022-04-22 PROCEDURE — 97110 THERAPEUTIC EXERCISES: CPT

## 2022-04-22 PROCEDURE — 97162 PT EVAL MOD COMPLEX 30 MIN: CPT

## 2022-04-22 NOTE — PROGRESS NOTES
Wiley Dia  : 1935  [place]  Secondary:  Tori Mcwilliams @ [address]  [phone]   [phone]      OUTPATIENT PHYSICAL THERAPY: Daily Treatment Note  2022   ICD-10: Treatment Diagnosis: Muscle weakness (generalized) (M62.81), Difficulty in walking, not elsewhere classified (R26.2) and Other abnormalities of gait and mobility (R26.89)  TREATMENT PLAN:  Effective Dates: 2022 TO 2022 (60 days). Frequency/Duration: 2 times a week for 60 Days  GOALS: (Goals have been discussed and agreed upon with patient.)  Short-Term Functional Goals: Time Frame: 2 weeks. 7. Patient will be independent with HEP. Discharge Goals: Time Frame: 6 weeks  7. Patient will increase knee extension strength to at least 4+/5 bilaterally to assist with rising from chair  8. Patient will decrease 5x STS test to <18 seconds without use of hands. 9. Patient will decrease TUG to < 12.5 seconds to improve ability to negotiate turns when walking at home.     _________________________________________________________________________  Pre-treatment Symptoms/Complaints:  See evaluation dated 22. Pain: Initial: 0/10 Post Session:  0/10   Medications Last Reviewed:  2022  Updated Objective Findings:  Below measures from initial evaluation unless otherwise noted.    Observation/Orthostatic Postural Assessment:          Kyphotic posture  Palpation:          Not tested   ROM:      Not tested         Strength:      R/L  Hip flexion: 4+/4+  Hip ABD 4/4-  Knee extension 4-/4  Knee flexion 5/5  Ankle DF 5/4+  Ankle eversion 5/5  Ankle PF 5/5        Special Tests:          Not tested   Neurological Screen:        Dermatomes:  Absent L S1, normal light touch throughout otherwise        Reflexes:  Patellar: R 1+, L 0, Achilles: R 0, L 0  Functional Mobility:         Gait/Ambulation:  Decreased R eccentric control at terminal swing         Sit<>Stand: Use of hands to stand, decreased eccentric control. Balance:          Feet together EO: > 30 seconds. Feet together EC < 5 seconds secondary to LOB. TREATMENT:   THERAPEUTIC ACTIVITY: ( see below for minutes): Therapeutic activities per grid below to improve mobility, strength, balance and coordination. Required minimal visual, verbal, manual and tactile cues to improve independence and safety with daily activities . THERAPEUTIC EXERCISE: (see below for minutes):  Exercises per grid below to improve mobility, strength, balance and coordination. Required minimal verbal and manual cues to promote proper body alignment, promote proper body posture and promote proper body mechanics. Progressed resistance, range, repetitions and complexity of movement as indicated. MANUAL THERAPY: (see below for minutes): Joint mobilization and Soft tissue mobilization was utilized and necessary because of the patient's restricted joint motion, painful spasm, loss of articular motion and restricted motion of soft tissue. MODALITIES: (see below for minutes):      to decrease pain    SELF CARE: (see below for minutes): Procedure(s) (per grid) utilized to improve and/or restore self-care/home management as related to dressing, bathing and grooming. Required minimal verbal cueing to facilitate activities of daily living skills and compensatory activities. Date: 4/22/22  Visit 1        Modalities:                                Therapeutic Exercise: 25 min        HEP education        LAQ  1x10 B        SLR   1x8 B        Bridge   1x10                       Proprioceptive Activities:                                Manual Therapy:                        Therapeutic Activities:                                  HEP: see 4/22/22 flow sheet for specifics.     [employer] Portal  Treatment/Session Summary:    · Response to Treatment:  Patient is independent with performance of above described home program.  · Communication/Consultation:  None today  · Equipment provided today:  None today  · Recommendations/Intent for next treatment session: Next visit will focus on LE strengthening, low level balance.     Total Treatment Billable Duration:  48 minutes  PT Patient Time In/Time Out  Time In: 1525  Time Out: [address], PT    Future Appointments   Date Time Provider Margy Romero   4/25/2022  2:45 PM Radha Blake, PT [facility]   5/3/2022  3:30 PM Radha Blake, PT [facility]   5/5/2022  3:30 PM Radha Blake, PT [facility]   5/10/2022  8:45 AM Radha Blake, PT [facility]   5/12/2022  3:30 PM Radha Blake, PT [facility]   5/17/2022  3:30 PM Radha Blake, PT [facility]   5/19/2022  3:30 PM [facility]   5/24/2022  8:45 AM Radha Blake, PT [facility]   5/26/2022  3:30 PM Radha Blake, PT [facility]   5/31/2022  3:30 PM Radha Blake, PT [facility]

## 2022-04-22 NOTE — THERAPY EVALUATION
Wiley Dia  : 1935 [address] [id number] [facility]  [phone]   [phone]        OUTPATIENT PHYSICAL THERAPY:Initial Assessment 2022         ICD-10: Treatment Diagnosis: Muscle weakness (generalized) (M62.81), Difficulty in walking, not elsewhere classified (R26.2) and Other abnormalities of gait and mobility (R26.89)  Precautions/Allergies: Other plant, animal, environmental   Fall Risk Score:     Ambulatory/Rehab Services H2 Model Falls Risk Assessment    Risk Factors:       (1)  Gender [Male] Ability to Rise from Chair:       (1)  Pushes up, successful in one attempt    Falls Prevention Plan:       No modifications necessary   Total: (5 or greater = High Risk): 2     [facility] [employer]. All Rights Reserved. [facility] [employer] Patent #[id number]. Federal Law prohibits the replication, distribution or use without written permission from [employer]     MD Orders: eval and treat MEDICAL/REFERRING DIAGNOSIS:  Muscle weakness [M62.81]   DATE OF ONSET: chronic   REFERRING PHYSICIAN: Graeme Gutierrez MD  RETURN PHYSICIAN APPOINTMENT: 2 weeks      INITIAL ASSESSMENT:  Mr. Linda Cotter presents to physical therapy with muscle weakness and difficulty walking secondary to MD diagnosis of neuropathy. He presents today with decreased LE strength, decreased reflexes and sensation, and decreased static and dynamic balance. These impairments lead to difficulty with rising/sitting from a chair without use of hands, ascending/descending stairs to enter/leave home, and bending forward to  objects off the floor. He will benefit from skilled PT to address his above impairments and functional limitations to assist with returning to [facility]. PROBLEM LIST (Impacting functional limitations):  1. Decreased Strength  2. Decreased ADL/Functional Activities  3. Decreased Transfer Abilities  4.  Decreased Ambulation Ability/Technique  5. Decreased Balance  6. Decreased [place] with Home Exercise Program INTERVENTIONS PLANNED:  1. Balance Exercise  2. Gait Training  3. Home Exercise Program (HEP)  4. Neuromuscular Re-education/Strengthening  5. Therapeutic Activites  6. Therapeutic Exercise/Strengthening    TREATMENT PLAN:  Effective Dates: 4/22/2022 TO 6/21/2022 (60 days). Frequency/Duration: 2 times a week for 60 Days  GOALS: (Goals have been discussed and agreed upon with patient.)  Short-Term Functional Goals: Time Frame: 2 weeks. 7. Patient will be independent with HEP. Discharge Goals: Time Frame: 6 weeks  7. Patient will increase knee extension strength to at least 4+/5 bilaterally to assist with rising from chair  8. Patient will decrease 5x STS test to <18 seconds without use of hands. 9. Patient will decrease TUG to < 12.5 seconds to improve ability to negotiate turns when walking at home. Rehabilitation Potential For Stated Goals: [address] therapy, I certify that the treatment plan above will be carried out by a therapist or under their direction. Thank you for this referral,  Jessica Fishman PT       Referring Physician Signature: Brendan Larsen MD              Date                      HISTORY:   History of Present Injury/Illness (Reason for Referral):  Patient reports about 1 month history of bilateral LE weakness with insidous onset. Since has followed with MD and neurologist, having CT scans of brain, neck and chest all of which were negative. Reports seeing neurologist earlier this week with dx. Of neuropathy and referred to PT. He started using a cane to assist with walking at the time. He denies any LE pain/numbness/tingling. Denies any history of falls. Reports that for the past 3 days has felt significantly better, not using his cane as much and not needing to use hands to rise from chair as often.  He does report occasional dizziness only when he stands quickly after sitting for a while, reports these symptoms are not new. Overall now most difficulty with rising from chair, ascending/descending stairs to enter home, and to pick things up off floor/bending forward. He does report noticing 6-7# weight loss over the past month or so, reports that his wife thinks he is not eating as much but he feels he is. Denies bowel/bladder changes. Patient denies any vision changes, denies changes in having SOB, chest pain, or difficulty breathing; patient with a history of COPD. Past Medical History/Comorbidities:   Mr. Sheila Renee  has a past medical history of Acid reflux, Allergic rhinitis, Anal fissure, Arthritis, Asthma, Atrial fibrillation ([address] CAD (coronary artery disease), Cancer ([address] Chronic obstructive pulmonary disease (COPD) ([address] Chronic sinusitis, COVID-19 vaccine series completed (02/17/2021), High cholesterol, High frequency hearing loss, History of nasal polyp, Hypertension, Melanoma ([address] (05/2021), Nasal obstruction, Pacemaker (8/29/2012), Peripheral vertigo, Sick sinus syndrome ([address] (12/10/2015), and SNHL (sensorineural hearing loss). He has no past medical history of Difficult intubation, Malignant hyperthermia due to anesthesia, Nausea & vomiting, or Pseudocholinesterase deficiency. Mr. Sheila Renee  has a past surgical history that includes hx septoplasty; hx coronary stent placement (6/2009); hx pacemaker (08/29/2012); hx cholecystectomy; hx back surgery (1984); hx aortic valve replacement (8/29/12); and hx cataract removal.  Social History/Living Environment:     Lives with wife, 2-3 MARSHALL with railings. Prior Level of Function/Work/Activity:  Enjoys golf and yard work. Dominant Side:         RIGHT  Current Medications:    Current Outpatient Medications:     aspirin delayed-release 81 mg tablet, Take 81 mg by mouth daily. , Disp: , Rfl:     predniSONE (DELTASONE) 1 mg tablet, Take 4 mg by mouth daily (with breakfast). , Disp: , Rfl:     acetaminophen (Tylenol Extra Strength) 500 mg tablet, Take 1,000 mg by mouth every six (6) hours as needed for Pain., Disp: , Rfl:     albuterol (PROVENTIL HFA, VENTOLIN HFA, PROAIR HFA) 90 mcg/actuation inhaler, INHALE 2 PUFFS EVERY 4 (FOUR) HOURS AS NEEDED FOR WHEEZING OR SHORTNESS OF BREATH, Disp: , Rfl:     apixaban (ELIQUIS) 2.5 mg tablet, Take 2.5 mg by mouth two (2) times a day., Disp: , Rfl:     umeclidinium-vilanterol (ANORO ELLIPTA) 62.5-25 mcg/actuation inhaler, Take 1 Puff by inhalation nightly., Disp: , Rfl:     alendronate (FOSAMAX) 70 mg tablet, Take 70 mg by mouth every seven (7) days. Friday, Disp: , Rfl:     sertraline (ZOLOFT) 50 mg tablet, Take  by mouth daily. , Disp: , Rfl:     losartan (COZAAR) 50 mg tablet, Take 25 mg by mouth every morning., Disp: , Rfl:     cholecalciferol (VITAMIN D3) 400 unit Tab tablet, Take 400 Units by mouth daily. , Disp: , Rfl:     ANTIOX#10/OM3/DHA/EPA/LUT/ZEAX (I-CAPS PO), Take 1 Tablet by mouth two (2) times a day. Last dose 8/20/13 , Disp: , Rfl:     LUTEIN PO, Take 1 Tablet by mouth two (2) times a day. Last dose 8/20/13 , Disp: , Rfl:     omega-3 fatty acids-vitamin e (FISH OIL) 1,000 mg cap, Take 1 Cap by mouth daily. Last dose 8/20/13, Disp: , Rfl:     coenzyme q10 (CO Q-10) 10 mg cap, Take 1 Tab by mouth daily. Last dose 8/20/13, Disp: , Rfl:     atorvastatin (LIPITOR) 80 mg tablet, Take 80 mg by mouth nightly., Disp: , Rfl:     fexofenadine (ALLEGRA) 180 mg tablet, Take 180 mg by mouth every morning., Disp: , Rfl:     esomeprazole (NEXIUM) 40 mg capsule, Take 40 mg by mouth every morning., Disp: , Rfl:     CYANOCOBALAMIN, VITAMIN B-12, (VITAMIN B-12 PO), Take 1,000 mcg by mouth daily. , Disp: , Rfl:    Date Last Reviewed:  4/22/2022   # of Personal Factors/Comorbidities that affect the Plan of Care: 1-2: MODERATE COMPLEXITY   EXAMINATION:   Observation/Orthostatic Postural Assessment:          Kyphotic posture  Vitals Seated: /70 HR 68 SpO2 95%.  Standing BP 122/70. Palpation:          Not tested   ROM:     Not tested       Strength:     R/L  Hip flexion: 4+/4+  Hip ABD 4/4-  Knee extension 4-/4  Knee flexion 5/5  Ankle DF 5/4+  Ankle eversion 5/5  Ankle PF 5/5      Special Tests:          Not tested   Neurological Screen:        Dermatomes:  Absent L S1, normal light touch throughout otherwise        Reflexes:  Patellar: R 1+, L 0, Achilles: R 0, L 0  Functional Mobility:         Gait/Ambulation:  Decreased R eccentric control at terminal swing         Sit<>Stand: Use of hands to stand, decreased eccentric control. Balance:          Feet together EO: > 30 seconds. Feet together EC < 5 seconds secondary to LOB. 5x STS: 21.85 seconds, needs use of hand held assist for push off after 1st repetition. Body Structures Involved:  1. Nerves  2. Bones  3. Joints  4. Muscles Body Functions Affected:  1. Neuromusculoskeletal Activities and Participation Affected:  1. Mobility  2. Self Care  3. Domestic Life  4. Community, Social and [place]   # of elements that affect the Plan of Care: 3: MODERATE COMPLEXITY   CLINICAL PRESENTATION:   Presentation: Evolving clinical presentation with changing clinical characteristics: MODERATE COMPLEXITY   CLINICAL DECISION MAKING:   Tool Used: Timed Up and Go (TUG)  Score:  Initial: 13.49 seconds Most Recent: X seconds (Date: -- )   Interpretation of Score: The test measures, in seconds, the time taken by an individual to stand up from a standard arm chair (seat height 46 cm [18 in], arm height 65 cm [25.6 in]), walk a distance of 3 meters (118 in, approx 10 ft), turn, walk back to the chair and sit down. If the individual takes longer than 14 seconds to complete TUG, this indicates risk for falls. Medical Necessity:   · Patient demonstrates good rehab potential due to higher previous functional level.   Reason for Services/Other Comments:  · Patient has demonstrated an improvement in functional level by independent completion of HEP.    Use of outcome tool(s) and clinical judgement create a POC that gives a: Questionable prediction of patient's progress: MODERATE COMPLEXITY     Total Treatment Duration:  PT Patient Time In/Time Out  Time In: [address], PT

## 2022-04-25 ENCOUNTER — HOSPITAL ENCOUNTER (OUTPATIENT)
Dept: PHYSICAL THERAPY | Age: 87
Discharge: HOME | End: 2022-04-25
Payer: MEDICARE

## 2022-04-25 PROCEDURE — 97110 THERAPEUTIC EXERCISES: CPT

## 2022-04-25 NOTE — PROGRESS NOTES
Wiley Dia  : 1935  [place]  Secondary:  Tori Mcwilliams @ [address]  [phone]   [phone]      OUTPATIENT PHYSICAL THERAPY: Daily Treatment Note  2022   ICD-10: Treatment Diagnosis: Muscle weakness (generalized) (M62.81), Difficulty in walking, not elsewhere classified (R26.2) and Other abnormalities of gait and mobility (R26.89)  TREATMENT PLAN:  Effective Dates: 2022 TO 2022 (60 days). Frequency/Duration: 2 times a week for 60 Days  GOALS: (Goals have been discussed and agreed upon with patient.)  Short-Term Functional Goals: Time Frame: 2 weeks. 7. Patient will be independent with HEP. Discharge Goals: Time Frame: 6 weeks  7. Patient will increase knee extension strength to at least 4+/5 bilaterally to assist with rising from chair  8. Patient will decrease 5x STS test to <18 seconds without use of hands. 9. Patient will decrease TUG to < 12.5 seconds to improve ability to negotiate turns when walking at home.     _________________________________________________________________________  Pre-treatment Symptoms/Complaints: HEP going well however does have LBP when performing bridges. Pain: Initial: 0/10 Post Session:  0/10   Medications Last Reviewed:  2022  Updated Objective Findings:  Below measures from initial evaluation unless otherwise noted.    Observation/Orthostatic Postural Assessment:          Kyphotic posture  Palpation:          Not tested   ROM:      Not tested         Strength:      R/L  Hip flexion: 4+/4+  Hip ABD 4/4-  Knee extension 4-/4  Knee flexion 5/5  Ankle DF 5/4+  Ankle eversion 5/5  Ankle PF 5/5        Special Tests:          Not tested   Neurological Screen:        Dermatomes:  Absent L S1, normal light touch throughout otherwise        Reflexes:  Patellar: R 1+, L 0, Achilles: R 0, L 0  Functional Mobility: Gait/Ambulation:  Decreased R eccentric control at terminal swing         Sit<>Stand: Use of hands to stand, decreased eccentric control. Balance:          Feet together EO: > 30 seconds. Feet together EC < 5 seconds secondary to LOB. TREATMENT:   THERAPEUTIC ACTIVITY: ( see below for minutes): Therapeutic activities per grid below to improve mobility, strength, balance and coordination. Required minimal visual, verbal, manual and tactile cues to improve independence and safety with daily activities . THERAPEUTIC EXERCISE: (see below for minutes):  Exercises per grid below to improve mobility, strength, balance and coordination. Required minimal verbal and manual cues to promote proper body alignment, promote proper body posture and promote proper body mechanics. Progressed resistance, range, repetitions and complexity of movement as indicated. MANUAL THERAPY: (see below for minutes): Joint mobilization and Soft tissue mobilization was utilized and necessary because of the patient's restricted joint motion, painful spasm, loss of articular motion and restricted motion of soft tissue. MODALITIES: (see below for minutes):      to decrease pain    SELF CARE: (see below for minutes): Procedure(s) (per grid) utilized to improve and/or restore self-care/home management as related to dressing, bathing and grooming. Required minimal verbal cueing to facilitate activities of daily living skills and compensatory activities.      Date: 4/22/22  Visit 1  4/25/22  Visit 2      Modalities:                                Therapeutic Exercise: 25 min 40 min       HEP education NuStep  5 min       LAQ  1x10 B LAQ  3x10 B  4#       SLR   1x8 B DKTC   x30       Bridge   1x10 Supine clam  3x10 B  grn        Standing calf raise  3x10        Standing hip ABD, hands on // bars  3x10 B        STS, chair/2 air ex  3x8        Slantboard stretch  3x30\"                      Proprioceptive Activities: Manual Therapy:                        Therapeutic Activities:                                  HEP: see 4/22/22 flow sheet for specifics. [employer] [facility]  Treatment/Session Summary:    · Response to Treatment:  Able to complete STS from elevated surface without use of hands, decreased repetitions to sets of 8 due to fatigue. · Communication/Consultation:  None today  · Equipment provided today:  None today  · Recommendations/Intent for next treatment session: Next visit will focus on LE strengthening, low level balance.     Total Treatment Billable Duration:  42 minutes  PT Patient Time In/Time Out  Time In: 9889  Time Out: [address], PT    Future Appointments   Date Time Provider Margy Romero   4/29/2022  4:15 PM Jamison Anon, PT [facility]   5/3/2022  3:30 PM Jamison Anon, PT [facility]   5/6/2022  3:30 PM Jamison Anon, PT [facility]   5/11/2022  3:30 PM Jamison Anon, PT [facility]   5/13/2022  3:30 PM Jamison Anon, PT [facility]   5/17/2022  3:30 PM Jamison Anon, PT [facility]   5/20/2022  3:30 PM [facility]   5/24/2022  8:45 AM Jamison Anon, PT [facility]   5/27/2022  3:30 PM Jamison Anon, PT [facility]   5/31/2022  3:30 PM Jamison Anon, PT [facility]

## 2022-04-26 ENCOUNTER — APPOINTMENT (OUTPATIENT)
Dept: PHYSICAL THERAPY | Age: 87
End: 2022-04-26
Payer: MEDICARE

## 2022-04-28 ENCOUNTER — APPOINTMENT (OUTPATIENT)
Dept: PHYSICAL THERAPY | Age: 87
End: 2022-04-28
Payer: MEDICARE

## 2022-04-29 ENCOUNTER — APPOINTMENT (OUTPATIENT)
Dept: PHYSICAL THERAPY | Age: 87
End: 2022-04-29
Payer: MEDICARE

## 2022-05-02 ENCOUNTER — APPOINTMENT (OUTPATIENT)
Dept: PHYSICAL THERAPY | Age: 87
End: 2022-05-02
Payer: MEDICARE

## 2022-05-03 ENCOUNTER — HOSPITAL ENCOUNTER (OUTPATIENT)
Dept: PHYSICAL THERAPY | Age: 87
Discharge: HOME | End: 2022-05-03
Payer: MEDICARE

## 2022-05-03 PROCEDURE — 97110 THERAPEUTIC EXERCISES: CPT

## 2022-05-03 NOTE — PROGRESS NOTES
Wiley Dia  : 1935  [place]  Secondary:  Tori Mcwilliams @ [address] MARIO Mckeon.  [phone]   [phone]      OUTPATIENT PHYSICAL THERAPY: Daily Treatment Note  5/3/2022   ICD-10: Treatment Diagnosis: Muscle weakness (generalized) (M62.81), Difficulty in walking, not elsewhere classified (R26.2) and Other abnormalities of gait and mobility (R26.89)  TREATMENT PLAN:  Effective Dates: 2022 TO 2022 (60 days). Frequency/Duration: 2 times a week for 60 Days  GOALS: (Goals have been discussed and agreed upon with patient.)  Short-Term Functional Goals: Time Frame: 2 weeks. 7. Patient will be independent with HEP. Discharge Goals: Time Frame: 6 weeks  7. Patient will increase knee extension strength to at least 4+/5 bilaterally to assist with rising from chair  8. Patient will decrease 5x STS test to <18 seconds without use of hands. 9. Patient will decrease TUG to < 12.5 seconds to improve ability to negotiate turns when walking at home.     _________________________________________________________________________  Pre-treatment Symptoms/Complaints: Reports feeling a lot stronger in his legs since starting PT. Pain: Initial: 0/10 Post Session:  0/10   Medications Last Reviewed:  5/3/2022  Updated Objective Findings:  Below measures from initial evaluation unless otherwise noted.    Observation/Orthostatic Postural Assessment:          Kyphotic posture  Palpation:          Not tested   ROM:      Not tested         Strength:      R/L  Hip flexion: 4+/4+  Hip ABD 4/4-  Knee extension 4-/4  Knee flexion 5/5  Ankle DF 5/4+  Ankle eversion 5/5  Ankle PF 5/5        Special Tests:          Not tested   Neurological Screen:        Dermatomes:  Absent L S1, normal light touch throughout otherwise        Reflexes:  Patellar: R 1+, L 0, Achilles: R 0, L 0  Functional Mobility: Gait/Ambulation:  Decreased R eccentric control at terminal swing         Sit<>Stand: Use of hands to stand, decreased eccentric control. Balance:          Feet together EO: > 30 seconds. Feet together EC < 5 seconds secondary to LOB. TREATMENT:   THERAPEUTIC ACTIVITY: ( see below for minutes): Therapeutic activities per grid below to improve mobility, strength, balance and coordination. Required minimal visual, verbal, manual and tactile cues to improve independence and safety with daily activities . THERAPEUTIC EXERCISE: (see below for minutes):  Exercises per grid below to improve mobility, strength, balance and coordination. Required minimal verbal and manual cues to promote proper body alignment, promote proper body posture and promote proper body mechanics. Progressed resistance, range, repetitions and complexity of movement as indicated. MANUAL THERAPY: (see below for minutes): Joint mobilization and Soft tissue mobilization was utilized and necessary because of the patient's restricted joint motion, painful spasm, loss of articular motion and restricted motion of soft tissue. MODALITIES: (see below for minutes):      to decrease pain    SELF CARE: (see below for minutes): Procedure(s) (per grid) utilized to improve and/or restore self-care/home management as related to dressing, bathing and grooming. Required minimal verbal cueing to facilitate activities of daily living skills and compensatory activities.      Date: 4/22/22  Visit 1  4/25/22  Visit 2 5/3/22  Visit 3     Modalities:                                Therapeutic Exercise: 25 min 40 min 38 min      HEP education NuStep  5 min NuStep   5 min      LAQ  1x10 B LAQ  3x10 B  4# DKTC   x30      SLR   1x8 B DKTC   x30 SLR  3x10  2#      Bridge   1x10 Supine clam  3x10 B  grn Calf raise  3x10       Standing calf raise  3x10 Standing hip ABD   3x10 B       Standing hip ABD, hands on // bars  3x10 B Rocker board taps, hands on // bars, CGA  3x10       STS, chair/2 air ex  3x8 Standing marching  3x10       Slantboard stretch  3x30\" STS chair/2 air ex  4x5        Slantboard stretch  3x30\"             Proprioceptive Activities:                                Manual Therapy:                        Therapeutic Activities:                                  HEP: see 4/22/22 flow sheet for specifics. [employer] [facility]  Treatment/Session Summary:    · Response to Treatment:  Progressing standing balance activities today, min use of hands as working toward SL stance with standing marching. Decreased repetitions for STS today to emphasis proper sequencing due to decreased forward weight shift causing patient to not be able to stand fully. · Communication/Consultation:  None today  · Equipment provided today:  None today  · Recommendations/Intent for next treatment session: Next visit will focus on LE strengthening, low level balance.     Total Treatment Billable Duration:  39 minutes  PT Patient Time In/Time Out  Time In: 4289  Time Out: ONEL Amaral    Future Appointments   Date Time Provider Margy Romero   5/6/2022  3:30 PM Lauren Hager, PT [facility]   5/11/2022  3:30 PM Lauren aHger PT [facility]   5/13/2022  3:30 PM [facility]   5/17/2022  3:30 PM [facility]   5/20/2022  3:30 PM Lauren Hager PT [facility]   5/24/2022  8:45 AM [facility]   5/27/2022  3:30 PM [facility]   5/31/2022  3:30 PM [facility]ager, [facility]

## 2022-05-05 ENCOUNTER — APPOINTMENT (OUTPATIENT)
Dept: PHYSICAL THERAPY | Age: 87
End: 2022-05-05
Payer: MEDICARE

## 2022-05-06 ENCOUNTER — HOSPITAL ENCOUNTER (OUTPATIENT)
Dept: PHYSICAL THERAPY | Age: 87
Discharge: HOME | End: 2022-05-06
Payer: MEDICARE

## 2022-05-06 PROCEDURE — 97110 THERAPEUTIC EXERCISES: CPT

## 2022-05-06 NOTE — PROGRESS NOTES
Wiley Dia  : 1935  [place]  Secondary:  Tori Mcwilliams @ [address] [address].  [phone]   [phone]      OUTPATIENT PHYSICAL THERAPY: Daily Treatment Note and Discharge Summary  2022   ICD-10: Treatment Diagnosis: Muscle weakness (generalized) (M62.81), Difficulty in walking, not elsewhere classified (R26.2) and Other abnormalities of gait and mobility (R26.89)  TREATMENT PLAN:  Effective Dates: 2022 TO 2022 (60 days). Frequency/Duration: 2 times a week for 60 Days  GOALS: (Goals have been discussed and agreed upon with patient.)  Short-Term Functional Goals: Time Frame: 2 weeks. 7. Patient will be independent with HEP. Discharge Goals: Time Frame: 6 weeks  7. Patient will increase knee extension strength to at least 4+/5 bilaterally to assist with rising from chair  8. Patient will decrease 5x STS test to <18 seconds without use of hands. 9. Patient will decrease TUG to < 12.5 seconds to improve ability to negotiate turns when walking at home.     _________________________________________________________________________  Pre-treatment Symptoms/Complaints: Overall feeling good however L knee was hurting a little yesterday. Pain: Initial: 0/10 Post Session:  0/10   Medications Last Reviewed:  2022  Updated Objective Findings:  Below measures from initial evaluation unless otherwise noted.    Observation/Orthostatic Postural Assessment:          Kyphotic posture  Palpation:          Not tested   ROM:      Not tested         Strength:      R/L  Hip flexion: 4+/4+  Hip ABD 4/4-  Knee extension 4-/4  Knee flexion 5/5  Ankle DF 5/4+  Ankle eversion 5/5  Ankle PF 5/5        Special Tests:          Not tested   Neurological Screen:        Dermatomes:  Absent L S1, normal light touch throughout otherwise        Reflexes:  Patellar: R 1+, L 0, Achilles: R 0, L 0  Functional Mobility:         Gait/Ambulation:  Decreased R eccentric control at terminal swing         Sit<>Stand: Use of hands to stand, decreased eccentric control. Balance:          Feet together EO: > 30 seconds. Feet together EC < 5 seconds secondary to LOB. TREATMENT:   THERAPEUTIC ACTIVITY: ( see below for minutes): Therapeutic activities per grid below to improve mobility, strength, balance and coordination. Required minimal visual, verbal, manual and tactile cues to improve independence and safety with daily activities . THERAPEUTIC EXERCISE: (see below for minutes):  Exercises per grid below to improve mobility, strength, balance and coordination. Required minimal verbal and manual cues to promote proper body alignment, promote proper body posture and promote proper body mechanics. Progressed resistance, range, repetitions and complexity of movement as indicated. MANUAL THERAPY: (see below for minutes): Joint mobilization and Soft tissue mobilization was utilized and necessary because of the patient's restricted joint motion, painful spasm, loss of articular motion and restricted motion of soft tissue. MODALITIES: (see below for minutes):      to decrease pain    SELF CARE: (see below for minutes): Procedure(s) (per grid) utilized to improve and/or restore self-care/home management as related to dressing, bathing and grooming. Required minimal verbal cueing to facilitate activities of daily living skills and compensatory activities.      Date: 4/22/22  Visit 1  4/25/22  Visit 2 5/3/22  Visit 3 5/6/22  Visit 4    Modalities:                                Therapeutic Exercise: 25 min 40 min 38 min 40 min     HEP education NuStep  5 min NuStep   5 min NuStep   5 min     LAQ  1x10 B LAQ  3x10 B  4# DKTC   x30 DKTC   x30     SLR   1x8 B DKTC   x30 SLR  3x10  2# Clamshell  3x10 B  grn     Bridge   1x10 Supine clam  3x10 B  grn Calf raise  3x10 Bridge w/ grn TB around knees  3x10      Standing calf raise  3x10 Standing hip ABD   3x10 B Standing hip ABD   3x12 B      Standing hip ABD, hands on // bars  3x10 B Rocker board taps, hands on // bars, CGA  3x10 Calf raise  3x10      STS, chair/2 air ex  3x8 Standing marching  3x10 Step up  4\" box  3x10 B      Slantboard stretch  3x30\" STS chair/2 air ex  4x5 STS chair/2 air ex  3x8       Slantboard stretch  3x30\" Feet together, EC balance  3x30\",  CG assist            Proprioceptive Activities:                                Manual Therapy:                        Therapeutic Activities:                                  HEP: see 4/22/22 flow sheet for specifics. [employer]  Treatment/Session Summary:    · Response to Treatment:  Progressing LE strengthening today with increasing repetitions of STS and adding step ups with 4\" box; no L knee pain throughout session, most fatigue following STS from elevated surface. Most difficulty with EC feet together balance due to increased postural sway. · Communication/Consultation:  None today  · Equipment provided today:  None today  · Recommendations/Intent for next treatment session: Next visit will focus on LE strengthening, low level balance.   · Addendum 5/17/22: Patient needs to hold on physical therapy due to having surgery and will return when cleared by MD.     Total Treatment Billable Duration:  42 minutes  PT Patient Time In/Time Out  Time In: 7283  Time Out: Anita Whittington 44, PT    Future Appointments   Date Time Provider Margy Romero   5/11/2022  3:30 PM Licha Cavanaugh, PT [facility]   5/13/2022  3:30 PM [facility]   5/17/2022  3:30 PM [facility]avanaugh, [facility]   5/20/2022  3:30 PM [facility]   5/24/2022  8:45 AM Licha Cavanaugh, PT [facility]   5/27/2022  3:30 PM [facility]   5/31/2022  3:30 PM [facility]ich[facility] Cavanaugh, PT [facility]

## 2022-05-11 ENCOUNTER — HOSPITAL ENCOUNTER (OUTPATIENT)
Dept: PHYSICAL THERAPY | Age: 87
Discharge: HOME | End: 2022-05-11
Payer: MEDICARE

## 2022-05-11 NOTE — PROGRESS NOTES
Patient called to cancel this weeks appointments due to [place] exposure.     Jaden Young PT, DPT

## 2022-05-12 ENCOUNTER — APPOINTMENT (OUTPATIENT)
Dept: PHYSICAL THERAPY | Age: 87
End: 2022-05-12
Payer: MEDICARE

## 2022-05-13 ENCOUNTER — HOSPITAL ENCOUNTER (OUTPATIENT)
Dept: PHYSICAL THERAPY | Age: 87
Discharge: HOME | End: 2022-05-13
Payer: MEDICARE

## 2022-05-13 NOTE — PROGRESS NOTES
Patient called to cancel this weeks appointments due to [place] exposure.     Jessica Fishman PT, DPT

## 2022-05-17 ENCOUNTER — APPOINTMENT (OUTPATIENT)
Dept: PHYSICAL THERAPY | Age: 87
End: 2022-05-17
Payer: MEDICARE

## 2022-05-19 ENCOUNTER — APPOINTMENT (OUTPATIENT)
Dept: PHYSICAL THERAPY | Age: 87
End: 2022-05-19
Payer: MEDICARE

## 2022-05-20 ENCOUNTER — APPOINTMENT (OUTPATIENT)
Dept: PHYSICAL THERAPY | Age: 87
End: 2022-05-20
Payer: MEDICARE

## 2022-05-24 ENCOUNTER — APPOINTMENT (OUTPATIENT)
Dept: PHYSICAL THERAPY | Age: 87
End: 2022-05-24
Payer: MEDICARE

## 2022-05-26 ENCOUNTER — APPOINTMENT (OUTPATIENT)
Dept: PHYSICAL THERAPY | Age: 87
End: 2022-05-26
Payer: MEDICARE

## 2022-05-27 ENCOUNTER — APPOINTMENT (OUTPATIENT)
Dept: PHYSICAL THERAPY | Age: 87
End: 2022-05-27
Payer: MEDICARE

## 2022-05-31 ENCOUNTER — APPOINTMENT (OUTPATIENT)
Dept: PHYSICAL THERAPY | Age: 87
End: 2022-05-31
Payer: MEDICARE

## 2022-06-08 ENCOUNTER — OFFICE VISIT (OUTPATIENT)
Dept: ORTHOPEDIC SURGERY | Age: 87
End: 2022-06-08
Payer: MEDICARE

## 2022-06-08 DIAGNOSIS — S92.352A CLOSED DISPLACED FRACTURE OF FIFTH METATARSAL BONE OF LEFT FOOT, INITIAL ENCOUNTER: ICD-10-CM

## 2022-06-08 DIAGNOSIS — S92.342A CLOSED DISPLACED FRACTURE OF FOURTH METATARSAL BONE OF LEFT FOOT, INITIAL ENCOUNTER: ICD-10-CM

## 2022-06-08 DIAGNOSIS — M79.672 LEFT FOOT PAIN: Primary | ICD-10-CM

## 2022-06-08 PROCEDURE — 1123F ACP DISCUSS/DSCN MKR DOCD: CPT | Performed by: ORTHOPAEDIC SURGERY

## 2022-06-08 PROCEDURE — 99214 OFFICE O/P EST MOD 30 MIN: CPT | Performed by: ORTHOPAEDIC SURGERY

## 2022-06-08 PROCEDURE — A9999 DME SUPPLY OR ACCESSORY, NOS: HCPCS | Performed by: ORTHOPAEDIC SURGERY

## 2022-06-08 PROCEDURE — 4004F PT TOBACCO SCREEN RCVD TLK: CPT | Performed by: ORTHOPAEDIC SURGERY

## 2022-06-08 PROCEDURE — 28470 CLTX METATARSAL FX WO MNP EA: CPT | Performed by: ORTHOPAEDIC SURGERY

## 2022-06-08 PROCEDURE — G8428 CUR MEDS NOT DOCUMENT: HCPCS | Performed by: ORTHOPAEDIC SURGERY

## 2022-06-08 PROCEDURE — G8417 CALC BMI ABV UP PARAM F/U: HCPCS | Performed by: ORTHOPAEDIC SURGERY

## 2022-06-08 NOTE — PROGRESS NOTES
Name: Niranjan More  YOB: 1935  Gender: male  MRN: [id number]    Summary:   Left fourth and fifth metatarsal fractures-global       CC: Foot Pain (left foot/ankle pain will xray today )       HPI: Niranjan More is a 80 y.o. male who presents with Foot Pain (left foot/ankle pain will xray today )  . This patient presents to the office today on the same day as an injury to his left foot when he rolled his foot over. History was obtained by Patient and his wife    ROS/Meds/PSH/PMH/FH/SH: I personally reviewed the patients standard intake form. Below are the pertinents    Tobacco:  reports that he quit smoking about 57 years ago. He smoked 1.00 pack per day. He has never used smokeless tobacco.  Diabetes: None      Physical Examination:    Exam of the left foot and ankle shows bruising and swelling located over the lateral foot. The ankle is nontender. His skin is intact. He has palpable pulses and intact sensation. Imaging:   I independently interpreted XR taken today  Left foot and ankle XR: AP, Lateral, Oblique views     ICD-10-CM    1. Left foot pain  M79.672 XR ANKLE LEFT (MIN 3 VIEWS)     XR FOOT LEFT (2 VIEWS)   2. Closed displaced fracture of fourth metatarsal bone of left foot, initial encounter  S92.342A    3. Closed displaced fracture of fifth metatarsal bone of left foot, initial encounter  S92.352A       Report: AP, lateral, oblique x-ray of the foot and ankle demonstrates left fourth and fifth metatarsal shaft fractures    Impression: Left fourth and fifth metatarsal shaft fractures   Adams Yusuf III, MD           Assessment:   Left fourth and fifth metatarsal shaft fractures    Treatment Plan:   4 This is a chronic illness/condition with exacerbation and progression  Treatment at this time: Closed treatment of fracture without manipulation which is an elective major procedure without identified risk factors  Studies ordered:  Foot XR needed @ Next Visit    Weight-bearing status: WBAT        Return to work/work restrictions: none  none    He is placed into a postop shoe today weightbearing as tolerated. He will return in 6 weeks for an x-ray.

## 2022-06-08 NOTE — PROGRESS NOTES
Patient was fitted and instructed on a Post Op Shoe for the left foot. Patient read and signed documenting they understand and agree to [facility]'s current DME return policy.

## 2022-07-22 ENCOUNTER — OFFICE VISIT (OUTPATIENT)
Dept: ORTHOPEDIC SURGERY | Age: 87
End: 2022-07-22

## 2022-07-22 DIAGNOSIS — S92.352A CLOSED DISPLACED FRACTURE OF FIFTH METATARSAL BONE OF LEFT FOOT, INITIAL ENCOUNTER: ICD-10-CM

## 2022-07-22 DIAGNOSIS — S92.342A CLOSED DISPLACED FRACTURE OF FOURTH METATARSAL BONE OF LEFT FOOT, INITIAL ENCOUNTER: Primary | ICD-10-CM

## 2022-07-22 PROCEDURE — 99024 POSTOP FOLLOW-UP VISIT: CPT | Performed by: ORTHOPAEDIC SURGERY

## 2022-07-22 NOTE — PROGRESS NOTES
Name: Orquidea Dent  YOB: 1935  Gender: male  MRN: [id number]    Procedure Performed:No surgery found  Left fourth and fifth metatarsal fractures      Date of Procedure: No surgery found      Subjective: He returns today with very little pain. He is wearing his postop shoe today. Physical Examination: He has expected swelling and very little tenderness to palpation at the forefoot.         Imaging:   I independently interpreted XR taken today  Foot XR: AP, Lateral, Oblique views     ICD-10-CM    1. Closed displaced fracture of fourth metatarsal bone of left foot, initial encounter  S92.342A XR FOOT LEFT (MIN 3 VIEWS)      2. Closed displaced fracture of fifth metatarsal bone of left foot, initial encounter  S92.352A XR FOOT LEFT (MIN 3 VIEWS)         Report: AP, lateral, oblique x-ray of the left foot demonstrates healing fourth and fifth metatarsal fractures    Impression:  Fourth and fifth metatarsal fractures   Darlyn Leslie III, MD           Assessment:   Left healing fourth and fifth metatarsal fractures      Plan:   3 This is stable chronic illness/condition  Treatment at this time: Time with no intervention  Studies ordered: NO XR needed @ Next Visit    Weight-bearing status: WBAT        Return to work/work restrictions: none  none

## 2022-12-05 ENCOUNTER — HOSPITAL ENCOUNTER (OUTPATIENT)
Dept: PHYSICAL THERAPY | Age: 87
Setting detail: RECURRING SERIES
Discharge: HOME | End: 2022-12-08
Payer: MEDICARE

## 2022-12-05 PROCEDURE — 97162 PT EVAL MOD COMPLEX 30 MIN: CPT

## 2022-12-05 PROCEDURE — 97110 THERAPEUTIC EXERCISES: CPT

## 2022-12-05 NOTE — PLAN OF CARE
Rey Grande  : 1935  Primary: Medicare Part A And B  Secondary: [address] @ [place] [place]  [address] [facility] [address]  Phone: [phone]  Fax: [phone] Plan Frequency: 2x per week  Plan of Care/Certification Expiration Date: 23    PT Visit Info:         Visit Count:  1                OUTPATIENT PHYSICAL THERAPY:             OP NOTE TYPE: Initial Assessment 2022               Episode  Appt Desk         Treatment Diagnosis:  Generalized Muscle Weakness (M62.81)  Difficulty in walking, Not elsewhere classified (R26.2)  Other abnormalities of gait and mobility (R26.89)  Medical/Referring Diagnosis:  Weakness [R53.1]  Referring Physician:  Veronica Arreola MD MD Orders:  PT Eval and Treat   Return MD Appt:  PRN  Date of Onset:       Allergies:  Patient has no known allergies. Restrictions/Precautions:           Medications Last Reviewed:  2022     SUBJECTIVE   History of Injury/Illness (Reason for Referral):  Patient with chronic LE weakness related to polyneuropathy. Feels like he has gotten weaker since the last time in PT which was back in May. Denies recent falls but did have a fall about 3 months ago. Last fall resulted in 2 toe fractures that healed with conservative treatment. Reports he is still able to play golf about 1 time a week. Denies numbness/tingling in LE. Does not use any assistive device; reports he did for a little time after foot fracture but then stopped. Trouble with walking, stairs, getting in/out of chair, prolonged activity. Reports he does not exercise at home outside of golf and a little walking.    Patient Stated Goal(s):  \"Strengthen legs and increase stamina\"  Initial:      010 Post Session:      010  Past Medical History/Comorbidities:   Mr. Joy Rogers  has a past medical history of Acid reflux, Allergic rhinitis, Anal fissure, Arthritis, Asthma, Atrial fibrillation ([facility] [place] [address] CAD (coronary artery disease), Cancer ([facility] [address] Chronic obstructive pulmonary disease (COPD) ([address] Chronic sinusitis, COVID-19 vaccine series completed, High cholesterol, High frequency hearing loss, History of nasal polyp, Hypertension, Melanoma ([address] Nasal obstruction, Pacemaker, Peripheral vertigo, Sick sinus syndrome ([address] and SNHL (sensorineural hearing loss). Mr. Patricia Kaplan  has a past surgical history that includes pacemaker (08/29/2012); Coronary angioplasty with stent (6/2009); Cataract removal; Aortic valve replacement (8/29/12); Septoplasty; Cholecystectomy; and back surgery (1984). Social History/Living Environment:   Lives With: Spouse  Home Layout: One level  Home Access: Stairs to enter with rails     Prior Level of Function/Work/Activity:   Prior level of function: Independent  Occupation: Retired           Learning:   Does the patient/guardian have any barriers to learning?: No barriers  Will there be a co-learner?: No  What is the preferred language of the patient/guardian?: English  Is an  required?: No  How does the patient/guardian prefer to learn new concepts?: Demonstration; Pictures/Videos     Fall Risk Scale: Fernandes Total Score: 50  Fernandes Fall Risk: High (45 and higher)         OBJECTIVE   Observation  Kyphotic posture  Increased forward trunk lean with ambulation, decreased stride length    Strength (R/L)   Hip flexion   4/4  Knee extension  5/4+  Knee flexion  4/4  Ankle DF   4+/4+  Ankle PF(MMT) 5/5  Ankle eversion  4+/4+      Hip ABD   4-/4-    Able to heel and toe step in place with hands on // bars for balance    Special Tests  TUG 11.66  30 second STS: 1 without use of hands  Feet together EO: > 30 seconds  Feet together EC: 5 seconds      ASSESSMENT   Initial Assessment:  Patient presents to physical therapy with LE weakness related to MD diagnosis of neuropathy. They present today with decreased strength and decreased balance.   These impairments are causing difficulty with the following tasks: walking, stairs, rising from chair, negotiating turns. Patient will benefit from skilled physical therapy to address their above impairments and functional limitations to assist with returning to PLOF. Problem List: (Impacting functional limitations): Body Structures, Functions, Activity Limitations Requiring Skilled Therapeutic Intervention: Decreased functional mobility ; Decreased ADL status; Decreased body mechanics; Decreased strength; Decreased balance; Decreased posture     Therapy Prognosis:   Therapy Prognosis: Fair     Initial Assessment Complexity:   Decision Making: Medium Complexity    PLAN   Effective Dates: 12/5/22  TO Plan of Care/Certification Expiration Date: 02/03/23   Frequency/Duration: Plan Frequency: 2x per week   Interventions Planned (Treatment may consist of any combination of the following):    Current Treatment Recommendations: Strengthening; Balance training; Functional mobility training; Transfer training; ADL/Self-care training; Gait training; Stair training; Neuromuscular re-education; Home exercise program; Therapeutic activities     Goals: (Goals have been discussed and agreed upon with patient.)  Short-Term Functional Goals: Time Frame: 2 weeks  Patient will be independent with HEP. Discharge Goals: Time Frame: 8 weeks  Patient will increase knee extension strength to 5/5 to improve ability to rise from chair. Patient will increase hip ABD strength to at least 4+/5 to improve walking efficiency. Patient will improve ABC scale to > 85% to improve ability to ascend/descend stairs           Outcome Measure: Tool Used: Activities-Specific Balance Confidence (ABC) Scale  Score:  Initial: 73% Confidence Most Recent: X% Confidence (Date: -- )   Interpretation of Score: The test rates the patients percentage of confidence with functional daily activities from 0%, indicating no confidence, to 100%, indicating complete confidence.  The percentages are added together and then averaged. If the average is less than 80%, this indicates significant impairment with daily activities. Medical Necessity:   > Patient demonstrates fair rehab potential due to higher previous functional level. Reason For Services/Other Comments:  > Patient continues to require skilled intervention due to above stated impairments and functional limitations. Total Duration:  Time In: 1010  Time Out: 1131 No. [place] therapy, I certify that the treatment plan above will be carried out by a therapist or under their direction.   Thank you for this referral,  Danii Goodwin, PT     Referring Physician Signature: Zeeshan Lemon MD _______________________________ Date _____________        Post Session Pain  Charge Capture  PT Visit Info MD Guidelines  MyChart

## 2022-12-05 NOTE — PROGRESS NOTES
Rey Grande  : 1935  Primary: Medicare Part [place] B  Secondary: Vu Mars @ [address]  [place] [address]  Phone: [phone]  Fax: [phone] Plan Frequency: 2x per week  Plan of Care/Certification Expiration Date: 23    PT Visit Info:  No data recorded   Visit Count:  1   OUTPATIENT PHYSICAL THERAPY:OP NOTE TYPE: Treatment Note 2022       Episode  }Appt Desk             Treatment Diagnosis:  Generalized Muscle Weakness (M62.81)  Difficulty in walking, Not elsewhere classified (R26.2)  Other abnormalities of gait and mobility (R26.89)  Medical/Referring Diagnosis:  Weakness [R53.1]  Referring Physician:  Carol Izaguirre MD MD Orders:  PT Eval and Treat   Date of Onset:  No data recorded   Allergies:   Patient has no known allergies. Restrictions/Precautions:  No data recorded  No data recorded   Interventions Planned (Treatment may consist of any combination of the following):    Current Treatment Recommendations: Strengthening; Balance training; Functional mobility training; Transfer training; ADL/Self-care training; Gait training; Stair training; Neuromuscular re-education; Home exercise program; Therapeutic activities     Subjective Comments: See IE 22. Initial:}     010Post Session:        0/10  Medications Last Reviewed:  2022  Updated Objective Findings:  See evaluation note from today  Treatment   TREATMENT:   THERAPEUTIC ACTIVITY: ( see below for minutes): Therapeutic activities per grid below to improve mobility, strength, balance and coordination. Required minimal visual, verbal, manual and tactile cues to improve independence and safety with daily activities . THERAPEUTIC EXERCISE: (see below for minutes): Exercises per grid below to improve mobility, strength, balance and coordination.  Required minimal verbal and manual cues to promote proper body alignment, promote proper body posture and promote proper body mechanics. Progressed resistance, range, repetitions and complexity of movement as indicated. MANUAL THERAPY: (see below for minutes): Joint mobilization and Soft tissue mobilization was utilized and necessary because of the patient's restricted joint motion, painful spasm, loss of articular motion and restricted motion of soft tissue. MODALITIES: (see below for minutes): to decrease pain   SELF CARE: (see below for minutes): Procedure(s) (per grid) utilized to improve and/or restore self-care/home management as related to dressing, bathing and grooming. Required minimal verbal cueing to facilitate activities of daily living skills and compensatory activities    Date: 12/5/22        Modalities:                                Therapeutic Exercise: 15 min        HEP review        SLR  3x10 B        S/l hip ABD  3x10 B        Bridge  3x10                        Proprioceptive Activities:                                Manual Therapy:                        Functional Activities:                                        Access Code: [email or web address]  URL: [email or web address]  Date: 12/05/2022  Prepared by: Shanita Bridge    Exercises  Supine Bridge - 1 x daily - 7 x weekly - 3 sets - 10 reps  Supine Active Straight Leg Raise - 1 x daily - 7 x weekly - 3 sets - 10 reps  Sidelying Hip Abduction - 1 x daily - 7 x weekly - 3 sets - 10 reps      Treatment/Session Summary:    Treatment Assessment: See IE 12/5/22. Communication/Consultation:  None today  Equipment provided today:  None  Recommendations/Intent for next treatment session: Next visit will focus on LE strengthening and endurance, balance activities.     Total Treatment Billable Duration:  40 minutes  Time In: 1010  Time Out: 1050    KATHERINE SEGOVIA, PT       Charge Capture  }Post Session Pain  PT Visit Info  [employer]  MD Guidelines  Scanned Media  Benefits  MyChart    Future Appointments   Date Time Provider Heena Carey   12/7/2022  2:00 PM Amada Larson [facility]   12/12/2022 10:15 AM Patrick Hearn PTA [facility]   12/14/2022 10:15 AM [facility]   12/20/2022 10:15 AM Chivo Yeung PT [facility]   12/28/2022 10:15 AM Patrick Hearn PTA [facility]

## 2022-12-07 ENCOUNTER — HOSPITAL ENCOUNTER (OUTPATIENT)
Dept: PHYSICAL THERAPY | Age: 87
Setting detail: RECURRING SERIES
Discharge: HOME | End: 2022-12-10
Payer: MEDICARE

## 2022-12-07 PROCEDURE — 97110 THERAPEUTIC EXERCISES: CPT

## 2022-12-07 NOTE — PROGRESS NOTES
Le EVONNE Harjinder  : 1935  Primary: Medicare Part A And B  Secondary: Vu Mars @ [address]  [facility] [address]  Phone: [phone]  Fax: [phone] Plan Frequency: 2x per week  Plan of Care/Certification Expiration Date: 23      PT Visit Info:  No data recorded   Visit Count:  2   OUTPATIENT PHYSICAL THERAPY:OP NOTE TYPE: Treatment Note 2022       Episode  }Appt Desk             Treatment Diagnosis:  Generalized Muscle Weakness (M62.81)  Difficulty in walking, Not elsewhere classified (R26.2)  Other abnormalities of gait and mobility (R26.89)  Medical/Referring Diagnosis:  Weakness [R53.1]  Referring Physician:  Terrence Cuevas MD MD Orders:  PT Eval and Treat   Date of Onset:  No data recorded   Allergies:   Patient has no known allergies. Restrictions/Precautions:  No data recorded  No data recorded   Interventions Planned (Treatment may consist of any combination of the following):    Current Treatment Recommendations: Strengthening; Balance training; Functional mobility training; Transfer training; ADL/Self-care training; Gait training; Stair training; Neuromuscular re-education; Home exercise program; Therapeutic activities     Subjective Comments: Pt reports no pain but reports mild soreness from last visit. Pt likes to golf once a week. Initial:}     010Post Session:        010  Medications Last Reviewed:  2022  Updated Objective Findings:  Treatment   TREATMENT:   THERAPEUTIC ACTIVITY: ( see below for minutes): Therapeutic activities per grid below to improve mobility, strength, balance and coordination. Required minimal visual, verbal, manual and tactile cues to improve independence and safety with daily activities . THERAPEUTIC EXERCISE: (see below for minutes): Exercises per grid below to improve mobility, strength, balance and coordination.  Required minimal verbal and manual cues to promote proper body alignment, promote proper body posture and promote proper body mechanics. Progressed resistance, range, repetitions and complexity of movement as indicated. MANUAL THERAPY: (see below for minutes): Joint mobilization and Soft tissue mobilization was utilized and necessary because of the patient's restricted joint motion, painful spasm, loss of articular motion and restricted motion of soft tissue. MODALITIES: (see below for minutes): to decrease pain   SELF CARE: (see below for minutes): Procedure(s) (per grid) utilized to improve and/or restore self-care/home management as related to dressing, bathing and grooming. Required minimal verbal cueing to facilitate activities of daily living skills and compensatory activities    Date: 12/5/22 12/7/22 (visit 2)      Modalities:                                Therapeutic Exercise: 15 min 40 min       HEP review        SLR  3x10 B SLR 2x10 B       S/l hip ABD  3x10 B Side lying 2x10 ABD B        Bridge  3x10  Bridge 2x10        Calf raises 3x10 No UE support        High knee marching with pause (one hand on // bar) x2 laps      Single leg stance  20 sec hold x 4 with one hand on bar      Cobble stone floor   Walking on uneven surfaces, forward (in //bars)    And lateral 4 lap each    Standing balance eyes closed 1 min hold x 2 CGA        Tandem walk in //bars x 2 laps      Sit to stand  3x10 with one airex pad and no UE support      Step ups  2x10 B forward and x15 B lateral  onto 6 inch with UE support      Proprioceptive Activities:                                Manual Therapy:                        Functional Activities:                                        Access Code: [email or web address]  URL: [email or web address]  Date: 12/05/2022  Prepared by: Almaz Bates    Exercises  Supine Bridge - 1 x daily - 7 x weekly - 3 sets - 10 reps  Supine Active Straight Leg Raise - 1 x daily - 7 x weekly - 3 sets - 10 reps  Sidelying Hip Abduction - 1 x daily - 7 x weekly - 3 sets - 10 reps      Treatment/Session Summary:    Treatment Assessment: Pt was encouraged to perform as many activities without UE support as he could. He had the most difficulty with sit to stands and step ups due to quadriceps weakness. Pt required SBA to CGA for safety. He was able to safely ascend the clinic steps with one hand on the rail. Communication/Consultation:  None today  Equipment provided today:  None  Recommendations/Intent for next treatment session: Next visit will focus on LE strengthening and endurance, balance activities.     Total Treatment Billable Duration:  40 minutes  Time In: 1400  Time Out: 1445    Janette Pineda PTA       Charge Capture  }Post Session Pain  PT Visit Info  [employer] Portal  MD Guidelines  Scanned Media  Benefits  MyChart    Future Appointments   Date Time Provider Heena Carey   12/12/2022 10:15 AM Janette Pineda PTA [facility]   12/14/2022 10:15 AM Janette Pineda PTA [facility]   12/20/2022 10:15 AM Melissa Purdy PT [facility]   12/28/2022 10:15 AM JASON UrenaOFJUD O

## 2022-12-12 ENCOUNTER — HOSPITAL ENCOUNTER (OUTPATIENT)
Dept: PHYSICAL THERAPY | Age: 87
Setting detail: RECURRING SERIES
Discharge: HOME | End: 2022-12-15
Payer: MEDICARE

## 2022-12-12 PROCEDURE — 97110 THERAPEUTIC EXERCISES: CPT

## 2022-12-12 NOTE — PROGRESS NOTES
Le EVONNE Harjinder  : 1935  Primary: Medicare Part A And B  Secondary: 9 Melissa Mars @ [address]  [address] [facility] [address]  Phone: [phone]  Fax: [phone] Plan Frequency: 2x per week  Plan of Care/Certification Expiration Date: 23      PT Visit Info:  No data recorded   Visit Count:  3   OUTPATIENT PHYSICAL THERAPY:OP NOTE TYPE: Treatment Note 2022       Episode  }Appt Desk             Treatment Diagnosis:  Generalized Muscle Weakness (M62.81)  Difficulty in walking, Not elsewhere classified (R26.2)  Other abnormalities of gait and mobility (R26.89)  Medical/Referring Diagnosis:  Weakness [R53.1]  Referring Physician:  Zoya Lea MD MD Orders:  PT Eval and Treat   Date of Onset:  No data recorded   Allergies:   Patient has no known allergies. Restrictions/Precautions:  No data recorded  No data recorded   Interventions Planned (Treatment may consist of any combination of the following):    Current Treatment Recommendations: Strengthening; Balance training; Functional mobility training; Transfer training; ADL/Self-care training; Gait training; Stair training; Neuromuscular re-education; Home exercise program; Therapeutic activities     Subjective Comments: Pt reports no pain. Pt did not golf last week because the course was wet and he could not take the cart on the greens. Initial:}     0/10Post Session:        0/10  Medications Last Reviewed:  2022  Updated Objective Findings:  Treatment   TREATMENT:   THERAPEUTIC ACTIVITY: ( see below for minutes): Therapeutic activities per grid below to improve mobility, strength, balance and coordination. Required minimal visual, verbal, manual and tactile cues to improve independence and safety with daily activities . THERAPEUTIC EXERCISE: (see below for minutes): Exercises per grid below to improve mobility, strength, balance and coordination.  Required minimal verbal and manual cues to promote proper body alignment, promote proper body posture and promote proper body mechanics. Progressed resistance, range, repetitions and complexity of movement as indicated. MANUAL THERAPY: (see below for minutes): Joint mobilization and Soft tissue mobilization was utilized and necessary because of the patient's restricted joint motion, painful spasm, loss of articular motion and restricted motion of soft tissue. MODALITIES: (see below for minutes): to decrease pain   SELF CARE: (see below for minutes): Procedure(s) (per grid) utilized to improve and/or restore self-care/home management as related to dressing, bathing and grooming.  Required minimal verbal cueing to facilitate activities of daily living skills and compensatory activities    Date: 12/5/22 12/7/22 (visit 2) 12/12/22 (visit 3)     Modalities:                                Therapeutic Exercise: 15 min 40 min 45 min      HEP review  Supine hamstring stretch with strap 30 sec hold x 4 B      SLR  3x10 B SLR 2x10 B SLR x10 B      S/l hip ABD  3x10 B Side lying 2x10 ABD B        Bridge  3x10  Bridge 2x10 Bridge 2x10       Calf raises 3x10 No UE support Slant board stretch 1 min hold on level 1     Walking backwards   4 laps in // bars with UE support       High knee marching with pause (one hand on // bar) x2 laps High knee marching in //bars, no UE support x 4 laps     Single leg stance  20 sec hold x 4 with one hand on bar      Cobble stone floor   Walking on uneven surfaces, forward (in //bars)    And lateral 4 lap each    Standing balance eyes closed 1 min hold x 2 CGA Narrow stance eyes closed 1 min hold x 2 (intermittent use of hands)    Modified tandem stance 30 sec hold x 4 B with UE support       Tandem walk in //bars x 2 laps Tandem walk x 4 laps with one hand on bar     Sit to stand  3x10 with one airex pad and no UE support 3x10 with one airex pad and no UE support     Step ups  2x10 B forward and x15 B lateral onto 6 inch with UE support      Proprioceptive Activities:                                Manual Therapy:                        Functional Activities:                                        Access Code: [email or web address]  URL: [email or web address]  Date: 12/05/2022  Prepared by: Gunner Dawkins    Exercises  Supine Bridge - 1 x daily - 7 x weekly - 3 sets - 10 reps  Supine Active Straight Leg Raise - 1 x daily - 7 x weekly - 3 sets - 10 reps  Sidelying Hip Abduction - 1 x daily - 7 x weekly - 3 sets - 10 reps      Treatment/Session Summary:    Treatment Assessment: Pt had difficulty walking backwards and with eyes closed activities. Pt required SBA to supervision for all activities. Communication/Consultation:  None today  Equipment provided today:  None  Recommendations/Intent for next treatment session: Next visit will focus on LE strengthening and endurance, balance activities.     Total Treatment Billable Duration:  45 minutes  Time In: 2890  Time Out: [address] [place] [facility]       Charge Capture  }Post Session Pain  PT Visit Info  [employer] Portal  MD Guidelines  Scanned Media  Benefits  MyChart    Future Appointments   Date Time Provider Heena Carey   12/14/2022 10:15 AM [facility]   12/20/2022 10:15 AM [facility]   12/28/2022 10:15 AM Richard Lo PTA [facility]

## 2022-12-14 ENCOUNTER — HOSPITAL ENCOUNTER (OUTPATIENT)
Dept: PHYSICAL THERAPY | Age: 87
Setting detail: RECURRING SERIES
Discharge: HOME | End: 2022-12-17
Payer: MEDICARE

## 2022-12-14 PROCEDURE — 97110 THERAPEUTIC EXERCISES: CPT

## 2022-12-14 NOTE — PROGRESS NOTES
Le EVONNE Harjinder  : 1935  Primary: Medicare Part A And B  Secondary: 9 Melissa Mars @ [address]  [facility] [address]  Phone: [phone]  Fax: [phone] Plan Frequency: 2x per week  Plan of Care/Certification Expiration Date: 23      PT Visit Info:  No data recorded   Visit Count:  4   OUTPATIENT PHYSICAL THERAPY:OP NOTE TYPE: Treatment Note 2022       Episode  }Appt Desk             Treatment Diagnosis:  Generalized Muscle Weakness (M62.81)  Difficulty in walking, Not elsewhere classified (R26.2)  Other abnormalities of gait and mobility (R26.89)  Medical/Referring Diagnosis:  Weakness [R53.1]  Referring Physician:  Bell Irizarry MD MD Orders:  PT Eval and Treat   Date of Onset:  No data recorded   Allergies:   Patient has no known allergies. Restrictions/Precautions:  No data recorded  No data recorded   Interventions Planned (Treatment may consist of any combination of the following):    Current Treatment Recommendations: Strengthening; Balance training; Functional mobility training; Transfer training; ADL/Self-care training; Gait training; Stair training; Neuromuscular re-education; Home exercise program; Therapeutic activities     Subjective Comments: Pt reports no pain. Initial:}     0/10Post Session:        0/10, pt reported mild lower extremity fatigue  Medications Last Reviewed:  2022  Updated Objective Findings:  Treatment   TREATMENT:   THERAPEUTIC ACTIVITY: ( see below for minutes): Therapeutic activities per grid below to improve mobility, strength, balance and coordination. Required minimal visual, verbal, manual and tactile cues to improve independence and safety with daily activities . THERAPEUTIC EXERCISE: (see below for minutes): Exercises per grid below to improve mobility, strength, balance and coordination.  Required minimal verbal and manual cues to promote proper body alignment, stone floor. 2 laps forward, 3 laps forward with 5# kettlebell, 2 laps latera with CGA to SBA      Tandem walk in //bars x 2 laps Tandem walk x 4 laps with one hand on bar     Sit to stand  3x10 with one airex pad and no UE support 3x10 with one airex pad and no UE support X10 on pad and x10 with 5# kettle bell    Step ups  2x10 B forward and x15 B lateral  onto 6 inch with UE support  X10 leading with R onto 10 in box  2x10 each LE onto 6 in box with one hand on bar    Proprioceptive Activities:                                Manual Therapy:                        Functional Activities:                                        Access Code: [email or web address]  URL: [email or web address]  Date: 12/05/2022  Prepared by: Husam Kirkpatrick    Exercises  Supine Bridge - 1 x daily - 7 x weekly - 3 sets - 10 reps  Supine Active Straight Leg Raise - 1 x daily - 7 x weekly - 3 sets - 10 reps  Sidelying Hip Abduction - 1 x daily - 7 x weekly - 3 sets - 10 reps      Treatment/Session Summary:    Treatment Assessment: Pt reported increased fatigue in the lower extremities with sit to stands and step ups. He was unable to complete step ups leading with the left LE onto the 10 inch step due to quadriceps weakness. Communication/Consultation:  None today  Equipment provided today:  None  Recommendations/Intent for next treatment session: Next visit will focus on LE strengthening and endurance, balance activities.     Total Treatment Billable Duration:  40 minutes (pt was 5 min late due to traffic)  Time In: 1020  Time Out: 1100    Daniel Rose PTA       Charge Capture  }Post Session Pain  PT Visit Info  [employer] Portal  MD Guidelines  Scanned Media  Benefits  MyChart    Future Appointments   Date Time Provider Heena Carey   12/20/2022 10:15 AM Husam Kirkpatrick PT [facility]   12/28/2022 10:15 AM [facility]

## 2022-12-20 ENCOUNTER — HOSPITAL ENCOUNTER (OUTPATIENT)
Dept: PHYSICAL THERAPY | Age: 87
Setting detail: RECURRING SERIES
Discharge: HOME | End: 2022-12-23
Payer: MEDICARE

## 2022-12-20 PROCEDURE — 97110 THERAPEUTIC EXERCISES: CPT

## 2022-12-20 NOTE — PROGRESS NOTES
Rey DOUGLASS Grande  : 1935  Primary: Medicare Part A And B  Secondary: 9 Melissa Mars @ [address]  [facility] [address]  Phone: [phone]  Fax: [phone] Plan Frequency: 2x per week  Plan of Care/Certification Expiration Date: 23      PT Visit Info:  No data recorded   Visit Count:  5   OUTPATIENT PHYSICAL THERAPY:OP NOTE TYPE: Treatment Note 2022       Episode  }Appt Desk             Treatment Diagnosis:  Generalized Muscle Weakness (M62.81)  Difficulty in walking, Not elsewhere classified (R26.2)  Other abnormalities of gait and mobility (R26.89)  Medical/Referring Diagnosis:  Weakness [R53.1]  Referring Physician:  Silver Stoner MD MD Orders:  PT Eval and Treat   Date of Onset:  No data recorded   Allergies:   Patient has no known allergies. Restrictions/Precautions:  No data recorded  No data recorded   Interventions Planned (Treatment may consist of any combination of the following):    Current Treatment Recommendations: Strengthening; Balance training; Functional mobility training; Transfer training; ADL/Self-care training; Gait training; Stair training; Neuromuscular re-education; Home exercise program; Therapeutic activities     Subjective Comments: Feeling a little stronger, hasn't been doing much the past week because of the weather. Initial:}     010Post Session:        0/10, pt reported mild lower extremity fatigue  Medications Last Reviewed:  2022  Updated Objective Findings:  Treatment   TREATMENT:   THERAPEUTIC ACTIVITY: ( see below for minutes): Therapeutic activities per grid below to improve mobility, strength, balance and coordination. Required minimal visual, verbal, manual and tactile cues to improve independence and safety with daily activities . THERAPEUTIC EXERCISE: (see below for minutes): Exercises per grid below to improve mobility, strength, balance and coordination. Required minimal verbal and manual cues to promote proper body alignment, promote proper body posture and promote proper body mechanics. Progressed resistance, range, repetitions and complexity of movement as indicated. MANUAL THERAPY: (see below for minutes): Joint mobilization and Soft tissue mobilization was utilized and necessary because of the patient's restricted joint motion, painful spasm, loss of articular motion and restricted motion of soft tissue. MODALITIES: (see below for minutes): to decrease pain   SELF CARE: (see below for minutes): Procedure(s) (per grid) utilized to improve and/or restore self-care/home management as related to dressing, bathing and grooming. Required minimal verbal cueing to facilitate activities of daily living skills and compensatory activities    Date: 12/12/22 (visit 3) 12/14/22 (visit 4) 12/20/22    Modalities:                        Therapeutic Exercise: 45 min 40 min 40 min    Supine hamstring stretch with strap 30 sec hold x 4 B D2 shoulder flexion/ext with red band 3x10 B UE Bridge  3x15    SLR x10 B  Marching in // bars over hurdles  4 laps fwd  4 laps lat      STS chair/2 air ex  4x10    Bridge 2x10  Step ups  3x10 ea  10\" box, hand held assist     Slant board stretch 1 min hold on level 1  Tandem balance  Firm  2x30\" ea         Walking backwards 4 laps in // bars with UE support Forward/back with blue band x 3 laps with one hand on bar and lateral walk with blue band x 3 laps no UE support     High knee marching in //bars, no UE support x 4 laps High knee marching in // bars with one hand on bar    Single leg stance      Cobble stone floor  Narrow stance eyes closed 1 min hold x 2 (intermittent use of hands)    Modified tandem stance 30 sec hold x 4 B with UE support Obstacle course:    6 in step, 3 hurdles and cobble stone floor.     2 laps forward, 3 laps forward with 5# kettlebell, 2 laps latera with CGA to SBA     Tandem walk x 4 laps with one hand on bar Sit to stand 3x10 with one airex pad and no UE support X10 on pad and x10 with 5# kettle bell    Step ups  X10 leading with R onto 10 in box  2x10 each LE onto 6 in box with one hand on bar    Proprioceptive Activities:                        Manual Therapy:                  Functional Activities:                              Access Code: [email or web address]  URL: [email or web address]  Date: 12/05/2022  Prepared by: Paula Horne    Exercises  Supine Bridge - 1 x daily - 7 x weekly - 3 sets - 10 reps  Supine Active Straight Leg Raise - 1 x daily - 7 x weekly - 3 sets - 10 reps  Sidelying Hip Abduction - 1 x daily - 7 x weekly - 3 sets - 10 reps      Treatment/Session Summary:    Treatment Assessment: Fatigues quickly with LE strengthening specifically with L LE. Difficulty with godwin walking requiring cues to prevent circumduction gait. Communication/Consultation:  None today  Equipment provided today:  None  Recommendations/Intent for next treatment session: Next visit will focus on LE strengthening and endurance, balance activities.     Total Treatment Billable Duration:  40 minutes   Time In: 0443  Time Out: 6111 Raffi Finnegan, PT       Charge Capture  }Post Session Pain  PT Visit Info  [employer] Portal  MD Guidelines  Scanned Media  Benefits  MyChart    Future Appointments   Date Time Provider Heena Carey   12/28/2022  3:30 PM [facility]

## 2022-12-28 ENCOUNTER — HOSPITAL ENCOUNTER (OUTPATIENT)
Dept: PHYSICAL THERAPY | Age: 87
Setting detail: RECURRING SERIES
End: 2022-12-28
Payer: MEDICARE

## 2023-01-04 ENCOUNTER — HOSPITAL ENCOUNTER (OUTPATIENT)
Dept: PHYSICAL THERAPY | Age: 88
Setting detail: RECURRING SERIES
Discharge: HOME | End: 2023-01-07
Payer: MEDICARE

## 2023-01-04 PROCEDURE — 97110 THERAPEUTIC EXERCISES: CPT

## 2023-01-04 NOTE — PROGRESS NOTES
Rey Grande  : 1935  Primary: Medicare Part A And B  Secondary: Vu Mars @ [address]  [facility] [address]  Phone: [phone]  Fax: [phone] Plan Frequency: 2x per week  Plan of Care/Certification Expiration Date: 23      PT Visit Info:  No data recorded   Visit Count:  6   OUTPATIENT PHYSICAL THERAPY:OP NOTE TYPE: Treatment Note 2023       Episode  }Appt Desk             Treatment Diagnosis:  Generalized Muscle Weakness (M62.81)  Difficulty in walking, Not elsewhere classified (R26.2)  Other abnormalities of gait and mobility (R26.89)  Medical/Referring Diagnosis:  Weakness [R53.1]  Referring Physician:  Cris Lindquist MD MD Orders:  PT Eval and Treat   Date of Onset:  No data recorded   Allergies:   Patient has no known allergies. Restrictions/Precautions:  No data recorded  No data recorded   Interventions Planned (Treatment may consist of any combination of the following):    Current Treatment Recommendations: Strengthening; Balance training; Functional mobility training; Transfer training; ADL/Self-care training; Gait training; Stair training; Neuromuscular re-education; Home exercise program; Therapeutic activities     Subjective Comments: No new complaints. Initial:}     0/10Post Session:        0/10, pt reported mild lower extremity fatigue  Medications Last Reviewed:  2023  Updated Objective Findings:  Treatment   TREATMENT:   THERAPEUTIC ACTIVITY: ( see below for minutes): Therapeutic activities per grid below to improve mobility, strength, balance and coordination. Required minimal visual, verbal, manual and tactile cues to improve independence and safety with daily activities . THERAPEUTIC EXERCISE: (see below for minutes): Exercises per grid below to improve mobility, strength, balance and coordination.  Required minimal verbal and manual cues to promote proper body alignment, promote proper body posture and promote proper body mechanics. Progressed resistance, range, repetitions and complexity of movement as indicated. MANUAL THERAPY: (see below for minutes): Joint mobilization and Soft tissue mobilization was utilized and necessary because of the patient's restricted joint motion, painful spasm, loss of articular motion and restricted motion of soft tissue. MODALITIES: (see below for minutes): to decrease pain   SELF CARE: (see below for minutes): Procedure(s) (per grid) utilized to improve and/or restore self-care/home management as related to dressing, bathing and grooming.  Required minimal verbal cueing to facilitate activities of daily living skills and compensatory activities    Date: 12/12/22 (visit 3) 12/14/22 (visit 4) 12/20/22 (visit 5) 1/4/23 (visit 6)   Modalities:                            Therapeutic Exercise: 45 min 40 min 40 min 45 min   stepper    L5, 5 min    Supine hamstring stretch with strap 30 sec hold x 4 B D2 shoulder flexion/ext with red band 3x10 B UE Bridge  3x15 Hamstring stretch 30 sec hold x 4 B with strap    SLR x10 B  Marching in // bars over hurdles  4 laps fwd  4 laps lat Marching in //bars without UE support x 4 laps      STS chair/2 air ex  4x10 Sit to stand 3x10 with airex pad and no UE support    Bridge 2x10  Step ups  3x10 ea  10\" box, hand held assist  Step ups x15 B onto 10 in with one hand on rail    Slant board stretch 1 min hold on level 1  Tandem balance  Firm  2x30\" ea        Standing balance with eyes closed 1 min hold x 2   Walking backwards 4 laps in // bars with UE support Forward/back with blue band x 3 laps with one hand on bar and lateral walk with blue band x 3 laps no UE support  Forward/back with blue band x 4 laps in //bars with intermittent UE support    High knee marching in //bars, no UE support x 4 laps High knee marching in // bars with one hand on bar  Mini squats with 7.5# kettle bell to mat table   Single leg stance [place] floor  Narrow stance eyes closed 1 min hold x 2 (intermittent use of hands)    Modified tandem stance 30 sec hold x 4 B with UE support Obstacle course:    6 in step, 3 hurdles and cobble stone floor. 2 laps forward, 3 laps forward with 5# kettlebell, 2 laps latera with CGA to SBA      Tandem walk x 4 laps with one hand on bar      Sit to stand 3x10 with one airex pad and no UE support X10 on pad and x10 with 5# kettle bell     Step ups  X10 leading with R onto 10 in box  2x10 each LE onto 6 in box with one hand on bar     Proprioceptive Activities:                            Manual Therapy:                     Functional Activities:                                   Access Code: [email or web address]  URL: [email or web address]  Date: 12/05/2022  Prepared by: Manjinder Michelle    Exercises  Supine Bridge - 1 x daily - 7 x weekly - 3 sets - 10 reps  Supine Active Straight Leg Raise - 1 x daily - 7 x weekly - 3 sets - 10 reps  Sidelying Hip Abduction - 1 x daily - 7 x weekly - 3 sets - 10 reps      Treatment/Session Summary:    Treatment Assessment:  Pt reported increased quadriceps fatigue with step ups and sit to stands. He also reports hamstring tightness. Pt requires frequent verbal cues to extend the spine during standing activities. Communication/Consultation:  None today  Equipment provided today:  None  Recommendations/Intent for next treatment session: Next visit will focus on LE strengthening and endurance, balance activities.     Total Treatment Billable Duration:  45 minutes   Time In: 1400  Time Out: [address], hospitals       Charge Capture  }Post Session Pain  PT Visit Info  [employer] Portal  MD Guidelines  Scanned Media  Benefits  MyChart    Future Appointments   Date Time Provider Heena Carey   1/6/2023  2:00 PM Veronica Banks PTA [facility]   1/9/2023  2:00 PM Veronica Banks PTA [facility]   1/11/2023  2:00 PM JASON FrancoOFR O 1/16/2023  2:00 PM Griselda Kava, PT [facility]   1/18/2023  2:00 PM [facility]loaf[facility]   1/23/2023  2:00 PM Gl[facility] Sugarloaf[facility]   1/25/2023  2:00 PM [facility]   2/1/2023  2:00 PM Glena Sugarloaf, [facility]

## 2023-01-06 ENCOUNTER — HOSPITAL ENCOUNTER (OUTPATIENT)
Dept: PHYSICAL THERAPY | Age: 88
Setting detail: RECURRING SERIES
Discharge: HOME | End: 2023-01-09
Payer: MEDICARE

## 2023-01-06 PROCEDURE — 97110 THERAPEUTIC EXERCISES: CPT

## 2023-01-06 NOTE — PROGRESS NOTES
Rey Gradne  : 1935  Primary: Medicare Part A And B  Secondary: [employer] @ [address]  [facility] [address]  Phone: [phone]  Fax: [phone] Plan Frequency: 2x per week  Plan of Care/Certification Expiration Date: 23      PT Visit Info:  No data recorded   Visit Count:  7   OUTPATIENT PHYSICAL THERAPY:OP NOTE TYPE: Treatment Note 2023       Episode  }Appt Desk             Treatment Diagnosis:  Generalized Muscle Weakness (M62.81)  Difficulty in walking, Not elsewhere classified (R26.2)  Other abnormalities of gait and mobility (R26.89)  Medical/Referring Diagnosis:  Weakness [R53.1]  Referring Physician:  Lord Sergio MD MD Orders:  PT Eval and Treat   Date of Onset:  No data recorded   Allergies:   Patient has no known allergies. Restrictions/Precautions:  No data recorded  No data recorded   Interventions Planned (Treatment may consist of any combination of the following):    Current Treatment Recommendations: Strengthening; Balance training; Functional mobility training; Transfer training; ADL/Self-care training; Gait training; Stair training; Neuromuscular re-education; Home exercise program; Therapeutic activities     Subjective Comments: No reports of pain today. Initial:}     0/10Post Session:        0/10 and mild fatigue in the B LE's  Medications Last Reviewed:  2023  Updated Objective Findings:  Treatment   TREATMENT:   THERAPEUTIC ACTIVITY: ( see below for minutes): Therapeutic activities per grid below to improve mobility, strength, balance and coordination. Required minimal visual, verbal, manual and tactile cues to improve independence and safety with daily activities . THERAPEUTIC EXERCISE: (see below for minutes): Exercises per grid below to improve mobility, strength, balance and coordination.  Required minimal verbal and manual cues to promote proper body alignment, promote proper body posture and promote proper body mechanics. Progressed resistance, range, repetitions and complexity of movement as indicated. MANUAL THERAPY: (see below for minutes): Joint mobilization and Soft tissue mobilization was utilized and necessary because of the patient's restricted joint motion, painful spasm, loss of articular motion and restricted motion of soft tissue. MODALITIES: (see below for minutes): to decrease pain   SELF CARE: (see below for minutes): Procedure(s) (per grid) utilized to improve and/or restore self-care/home management as related to dressing, bathing and grooming.  Required minimal verbal cueing to facilitate activities of daily living skills and compensatory activities    Date: 12/12/22 (visit 3) 12/14/22 (visit 4) 12/20/22 (visit 5) 1/4/23 (visit 6) 1/6/23 (visit 7)   Modalities:                                Therapeutic Exercise: 45 min 40 min 40 min 45 min 45 min   stepper    L5, 5 min L5, 5 min    Supine hamstring stretch with strap 30 sec hold x 4 B D2 shoulder flexion/ext with red band 3x10 B UE Bridge  3x15 Hamstring stretch 30 sec hold x 4 B with strap     SLR x10 B  Marching in // bars over hurdles  4 laps fwd  4 laps lat Marching in //bars without UE support x 4 laps Marching x 4 laps in //bars, no UE support      STS chair/2 air ex  4x10 Sit to stand 3x10 with airex pad and no UE support Sit to stand with UE support from chair, 3x10    Bridge 2x10  Step ups  3x10 ea  10\" box, hand held assist  Step ups x15 B onto 10 in with one hand on rail Step ups forward x15 B onto 10 in box without UE support    Slant board stretch 1 min hold on level 1  Tandem balance  Firm  2x30\" ea         Standing balance with eyes closed 1 min hold x 2 Standing balance EO and EC x 1 min ea on foam, no UE support   Walking backwards 4 laps in // bars with UE support Forward/back with blue band x 3 laps with one hand on bar and lateral walk with blue band x 3 laps no UE support  Forward/back with blue band x 4 laps in //bars with intermittent UE support     High knee marching in //bars, no UE support x 4 laps High knee marching in // bars with one hand on bar  Mini squats with 7.5# kettle bell to mat table    Single leg stance     30 sec hold x 4 B with UE support   [place] floor  Narrow stance eyes closed 1 min hold x 2 (intermittent use of hands)    Modified tandem stance 30 sec hold x 4 B with UE support Obstacle course:    6 in step, 3 hurdles and cobble stone floor. 2 laps forward, 3 laps forward with 5# kettlebell, 2 laps latera with CGA to SBA       Tandem walk x 4 laps with one hand on bar    Heel raises no UE support x30   Sit to stand 3x10 with one airex pad and no UE support X10 on pad and x10 with 5# kettle bell   Slant board stretch 1 min hold on level 1   Step ups  X10 leading with R onto 10 in box  2x10 each LE onto 6 in box with one hand on bar   Forward up and over the foam pad with 7.5# kettle bell, SBA   Proprioceptive Activities:                                Manual Therapy:                        Functional Activities:                                        Access Code: [email or web address]  URL: [email or web address]  Date: 12/05/2022  Prepared by: Oscar Carey    Exercises  Supine Bridge - 1 x daily - 7 x weekly - 3 sets - 10 reps  Supine Active Straight Leg Raise - 1 x daily - 7 x weekly - 3 sets - 10 reps  Sidelying Hip Abduction - 1 x daily - 7 x weekly - 3 sets - 10 reps      Treatment/Session Summary:    Treatment Assessment:  Pt is making progress towards goals and he was able to complete step ups on the 10 in box without UE support. He continues to have difficulty with sit to stands. Communication/Consultation:  None today  Equipment provided today:  None  Recommendations/Intent for next treatment session: Next visit will focus on LE strengthening and endurance, balance activities.     Total Treatment Billable Duration:  45 minutes   Time In: 1400  Time Out: 1445    Vaishali Su PTA       Charge Capture  }Post Session Pain  PT Visit Info  [employer] Portal  MD Guidelines  Scanned Media  Benefits  MyChart    Future Appointments   Date Time Provider Heena Carey   1/9/2023  2:00 PM [facility]   1/11/2023  2:00 PM [facility]   1/16/2023  2:00 PM [facility]   1/18/2023  2:00 PM [facility]   1/23/2023  2:00 PM Vaishali Su PTA [facility]   1/25/2023  2:00 PM Hailee James PT [facility]   2/1/2023  2:00 PM Vaishali Su PTA [facility]

## 2023-01-09 ENCOUNTER — HOSPITAL ENCOUNTER (OUTPATIENT)
Dept: PHYSICAL THERAPY | Age: 88
Setting detail: RECURRING SERIES
Discharge: HOME | End: 2023-01-12
Payer: MEDICARE

## 2023-01-09 PROCEDURE — 97110 THERAPEUTIC EXERCISES: CPT

## 2023-01-09 NOTE — PROGRESS NOTES
Rey Grande  : 1935  Primary: Medicare Part A And B  Secondary: [employer] [facility] @ [place] [address]  Phone: [phone]  Fax: [phone] Plan Frequency: 2x per week  Plan of Care/Certification Expiration Date: 23      PT Visit Info:  No data recorded   Visit Count:  8   OUTPATIENT PHYSICAL THERAPY:OP NOTE TYPE: Treatment Note  2023       Episode  }Appt Desk             Treatment Diagnosis:  Generalized Muscle Weakness (M62.81)  Difficulty in walking, Not elsewhere classified (R26.2)  Other abnormalities of gait and mobility (R26.89)  Medical/Referring Diagnosis:  Weakness [R53.1]  Referring Physician:  Kade Bhandari MD MD Orders:  PT Eval and Treat   Date of Onset:  No data recorded   Allergies:   Patient has no known allergies.  Restrictions/Precautions:  No data recorded  No data recorded   Interventions Planned (Treatment may consist of any combination of the following):    Current Treatment Recommendations: Strengthening; Balance training; Functional mobility training; Transfer training; ADL/Self-care training; Gait training; Stair training; Neuromuscular re-education; Home exercise program; Therapeutic activities     Subjective Comments: Pt reported no pain prior to treatment.     Initial:}     0/10Post Session:        010   Medications Last Reviewed:  2023  Updated Objective Findings:     Treatment   TREATMENT:   THERAPEUTIC ACTIVITY: ( see below for minutes): Therapeutic activities per grid below to improve mobility, strength, balance and coordination. Required minimal visual, verbal, manual and tactile cues to improve independence and safety with daily activities .  THERAPEUTIC EXERCISE: (see below for minutes): Exercises per grid below to improve mobility, strength, balance and coordination. Required minimal verbal and manual cues to promote proper body alignment, promote proper body  posture and promote proper body mechanics. Progressed resistance, range, repetitions and complexity of movement as indicated. MANUAL THERAPY: (see below for minutes): Joint mobilization and Soft tissue mobilization was utilized and necessary because of the patient's restricted joint motion, painful spasm, loss of articular motion and restricted motion of soft tissue. MODALITIES: (see below for minutes): to decrease pain   SELF CARE: (see below for minutes): Procedure(s) (per grid) utilized to improve and/or restore self-care/home management as related to dressing, bathing and grooming.  Required minimal verbal cueing to facilitate activities of daily living skills and compensatory activities    Date: 12/12/22 (visit 3) 12/14/22 (visit 4) 12/20/22 (visit 5) 1/4/23 (visit 6) 1/6/23 (visit 7) 1/9/23 (visit 8)   Modalities:                                    Therapeutic Exercise: 45 min 40 min 40 min 45 min 45 min 45 min   stepper    L5, 5 min L5, 5 min L7 5 min    Supine hamstring stretch with strap 30 sec hold x 4 B D2 shoulder flexion/ext with red band 3x10 B UE Bridge  3x15 Hamstring stretch 30 sec hold x 4 B with strap  Hamstring stretch 30 sec hold x 4 B with strap    SLR x10 B  Marching in // bars over hurdles  4 laps fwd  4 laps lat Marching in //bars without UE support x 4 laps Marching x 4 laps in //bars, no UE support Lower trunk rotation stretch 10 sec hold x 10 B      STS chair/2 air ex  4x10 Sit to stand 3x10 with airex pad and no UE support Sit to stand with UE support from chair, 3x10 Sit to stand, no UE support, successively lower mat table    Bridge 2x10  Step ups  3x10 ea  10\" box, hand held assist  Step ups x15 B onto 10 in with one hand on rail Step ups forward x15 B onto 10 in box without UE support X15 B onto 10 in box with one hand on bar    Slant board stretch 1 min hold on level 1  Tandem balance  Firm  2x30\" ea   Tandem walk x 3 laps in //bar with one hand on bar       Standing balance with eyes closed 1 min hold x 2 Standing balance EO and EC x 1 min ea on foam, no UE support Standing balance eyes closed on foam 1 min hold x 2 with CGA   Walking backwards 4 laps in // bars with UE support Forward/back with blue band x 3 laps with one hand on bar and lateral walk with blue band x 3 laps no UE support  Forward/back with blue band x 4 laps in //bars with intermittent UE support  Bridges x10 supine    High knee marching in //bars, no UE support x 4 laps High knee marching in // bars with one hand on bar  Mini squats with 7.5# kettle bell to mat table     Single leg stance     30 sec hold x 4 B with UE support 30 sec hold x 4 B with one hand on bar   [place] stone floor  Narrow stance eyes closed 1 min hold x 2 (intermittent use of hands)    Modified tandem stance 30 sec hold x 4 B with UE support Obstacle course:    6 in step, 3 hurdles and cobble stone floor. 2 laps forward, 3 laps forward with 5# philip, 2 laps latera with CGA to SBA        Tandem walk x 4 laps with one hand on bar    Heel raises no UE support x30 X30 heel raise no UE support   Sit to stand 3x10 with one airex pad and no UE support X10 on pad and x10 with 5# kettle bell   Slant board stretch 1 min hold on level 1 1 min hold on level 1 on incline   Step ups  X10 leading with R onto 10 in box  2x10 each LE onto 6 in box with one hand on bar   Forward up and over the foam pad with 7.5# kettle bell, SBA    Proprioceptive Activities:                                    Manual Therapy:                           Functional Activities:                                             Access Code: [email or web address]  URL: [email or web address]  Date: 12/05/2022  Prepared by: Perry Melville    Exercises  Supine Bridge - 1 x daily - 7 x weekly - 3 sets - 10 reps  Supine Active Straight Leg Raise - 1 x daily - 7 x weekly - 3 sets - 10 reps  Sidelying Hip Abduction - 1 x daily - 7 x weekly - 3 sets - 10 reps      Treatment/Session Summary: Treatment Assessment:  Pt did not report increased pain or fatigue today. Plan to begin diagonal UE movements. Communication/Consultation:  None today  Equipment provided today:  None  Recommendations/Intent for next treatment session: Next visit will focus on LE strengthening and endurance, balance activities.     Total Treatment Billable Duration:  45 minutes   Time In: 1400  Time Out: 1445    Mp Horton PTA       Charge Capture  }Post Session Pain  PT Visit Info  Walk-in Portal  MD Guidelines  Scanned Media  Benefits  MyChart    Future Appointments   Date Time Provider Heena Carey   1/11/2023  2:00 PM Mp Horton PTA [facility]   1/16/2023  2:00 PM Buster Mall, PT [facility]   1/18/2023  2:00 PM Mp Horton PTA [facility]   1/23/2023  2:00 PM Mp Horton, JASON [facility]   1/25/2023  2:00 PM Buster Mall, PT [facility]   2/1/2023  2:00 PM Mp Horton, JASON [facility]

## 2023-01-11 ENCOUNTER — HOSPITAL ENCOUNTER (OUTPATIENT)
Dept: PHYSICAL THERAPY | Age: 88
Setting detail: RECURRING SERIES
End: 2023-01-11
Payer: MEDICARE

## 2023-01-11 NOTE — PROGRESS NOTES
Pt cancelled because of a family emergency. The appointment was cancelled less than 24 hours from the appointment time.

## 2023-01-16 ENCOUNTER — HOSPITAL ENCOUNTER (OUTPATIENT)
Dept: PHYSICAL THERAPY | Age: 88
Setting detail: RECURRING SERIES
Discharge: HOME | End: 2023-01-19
Payer: MEDICARE

## 2023-01-16 PROCEDURE — 97110 THERAPEUTIC EXERCISES: CPT

## 2023-01-16 NOTE — PROGRESS NOTES
Rey Grande  : 1935  Primary: Medicare Part A And B  Secondary: [address] Melissa Mars @ [address]  [place] [address]  Phone: [phone]  Fax: [phone] Plan Frequency: 2x per week  Plan of Care/Certification Expiration Date: 23      PT Visit Info:  No data recorded   Visit Count:  9   OUTPATIENT PHYSICAL THERAPY:OP NOTE TYPE: Progress Note and Treatment Note  2023       Episode  }Appt Desk             Treatment Diagnosis:  Generalized Muscle Weakness (M62.81)  Difficulty in walking, Not elsewhere classified (R26.2)  Other abnormalities of gait and mobility (R26.89)  Medical/Referring Diagnosis:  Weakness [R53.1]  Referring Physician:  Tacos Sarabia MD MD Orders:  PT Eval and Treat   Date of Onset:  No data recorded   Allergies:   Patient has no known allergies. Restrictions/Precautions:  No data recorded  No data recorded   Interventions Planned (Treatment may consist of any combination of the following):    Current Treatment Recommendations: Strengthening; Balance training; Functional mobility training; Transfer training; ADL/Self-care training; Gait training; Stair training; Neuromuscular re-education; Home exercise program; Therapeutic activities     Subjective Comments: Feeling stronger in LE.       Initial:}     0/10Post Session:        0/10   Medications Last Reviewed:  2023  Updated Objective Findings: PN 23   Observation  Kyphotic posture  Increased forward trunk lean with ambulation, decreased stride length     Strength (R/L)   Hip flexion                   4+/4+  Knee extension           5/4+  Knee flexion                4+/4+  Ankle DF                     4+/4+  Ankle PF(MMT)           5/5  Ankle eversion            4+/4+    Hip ABD                      4/4-     Able to heel and toe step in place with hands on // bars for balance     Special Tests  TUG 9.6 seconds   30 second STS: 3 without use of hands  Feet together EO: L fwd: 16 seconds, R fwd: 10 seconds  Feet together EC: 5 seconds  Treatment   TREATMENT:   THERAPEUTIC ACTIVITY: ( see below for minutes): Therapeutic activities per grid below to improve mobility, strength, balance and coordination. Required minimal visual, verbal, manual and tactile cues to improve independence and safety with daily activities . THERAPEUTIC EXERCISE: (see below for minutes): Exercises per grid below to improve mobility, strength, balance and coordination. Required minimal verbal and manual cues to promote proper body alignment, promote proper body posture and promote proper body mechanics. Progressed resistance, range, repetitions and complexity of movement as indicated. MANUAL THERAPY: (see below for minutes): Joint mobilization and Soft tissue mobilization was utilized and necessary because of the patient's restricted joint motion, painful spasm, loss of articular motion and restricted motion of soft tissue. MODALITIES: (see below for minutes): to decrease pain   SELF CARE: (see below for minutes): Procedure(s) (per grid) utilized to improve and/or restore self-care/home management as related to dressing, bathing and grooming.  Required minimal verbal cueing to facilitate activities of daily living skills and compensatory activities    Date: 12/12/22 (visit 3) 12/14/22 (visit 4) 12/20/22 (visit 5) 1/4/23 (visit 6) 1/6/23 (visit 7) 1/9/23 (visit 8) 1/16/23    Modalities:                                        Therapeutic Exercise: 45 min 40 min 40 min 45 min 45 min 45 min 42 min   stepper    L5, 5 min L5, 5 min L7 5 min           Feet together EC, air ex  3x30\"          Tandem balance EO, firm  3x30\"           Standing hip ABD, on air ex  3x10 B          Air ex stance, B scaption raise  1x10  4#          Small range KB deadlift  3x10  15#          Marching in // bars  5 laps    Supine hamstring stretch with strap 30 sec hold x 4 B D2 shoulder flexion/ext with red band 3x10 B UE Bridge  3x15 Hamstring stretch 30 sec hold x 4 B with strap  Hamstring stretch 30 sec hold x 4 B with strap     SLR x10 B  Marching in // bars over hurdles  4 laps fwd  4 laps lat Marching in //bars without UE support x 4 laps Marching x 4 laps in //bars, no UE support Lower trunk rotation stretch 10 sec hold x 10 B       STS chair/2 air ex  4x10 Sit to stand 3x10 with airex pad and no UE support Sit to stand with UE support from chair, 3x10 Sit to stand, no UE support, successively lower mat table     Bridge 2x10  Step ups  3x10 ea  10\" box, hand held assist  Step ups x15 B onto 10 in with one hand on rail Step ups forward x15 B onto 10 in box without UE support X15 B onto 10 in box with one hand on bar     Slant board stretch 1 min hold on level 1  Tandem balance  Firm  2x30\" ea   Tandem walk x 3 laps in //bar with one hand on bar        Standing balance with eyes closed 1 min hold x 2 Standing balance EO and EC x 1 min ea on foam, no UE support Standing balance eyes closed on foam 1 min hold x 2 with CGA    Walking backwards 4 laps in // bars with UE support Forward/back with blue band x 3 laps with one hand on bar and lateral walk with blue band x 3 laps no UE support  Forward/back with blue band x 4 laps in //bars with intermittent UE support  Bridges x10 supine     High knee marching in //bars, no UE support x 4 laps High knee marching in // bars with one hand on bar  Mini squats with 7.5# kettle bell to mat table      Single leg stance     30 sec hold x 4 B with UE support 30 sec hold x 4 B with one hand on bar    Cobble stone floor  Narrow stance eyes closed 1 min hold x 2 (intermittent use of hands)    Modified tandem stance 30 sec hold x 4 B with UE support Obstacle course:    6 in step, 3 hurdles and cobble stone floor.     2 laps forward, 3 laps forward with 5# kettlebell, 2 laps latera with CGA to SBA         Tandem walk x 4 laps with one hand on bar    Heel raises no UE support x30 X30 heel raise no UE support    Sit to stand 3x10 with one airex pad and no UE support X10 on pad and x10 with 5# kettle bell   Slant board stretch 1 min hold on level 1 1 min hold on level 1 on incline    Step ups  X10 leading with R onto 10 in box  2x10 each LE onto 6 in box with one hand on bar   Forward up and over the foam pad with 7.5# priyanka bell, SBA     Proprioceptive Activities:                                        Manual Therapy:                              Functional Activities:                                                  Access Code: [email or web address]  URL: [email or web address]  Date: 12/05/2022  Prepared by: Denise Chamberlain    Exercises  Supine Bridge - 1 x daily - 7 x weekly - 3 sets - 10 reps  Supine Active Straight Leg Raise - 1 x daily - 7 x weekly - 3 sets - 10 reps  Sidelying Hip Abduction - 1 x daily - 7 x weekly - 3 sets - 10 reps      Treatment/Session Summary:    Treatment Assessment:  Patient has made objective improvements with increasing LE strength and functional strength leading to leading to improved ability to rising/sitting to/from chair, walking. They continue to have impairments with LE strength and balance causing difficulty with walking on uneven ground, prolonged walking or activities, rising from chair without use of hands, and negotiating turns. Patient will continue to benefit from skilled physical therapy to continue improving their above impairments and functional limitations to return to their PLOF. Communication/Consultation:  None today  Equipment provided today:  None  Recommendations/Intent for next treatment session: Next visit will focus on balance activity progressions toward functional ADLs.     Total Treatment Billable Duration:  42 minutes   Time In: 8279  Time Out: Vene 89, PT       Charge Capture  }Post Session Pain  PT Visit Info  [facility] Portal  MD Guidelines  Scanned Media  Benefits  MyChart    Future Appointments   Date Time Provider Heena Carey   1[facility]18[facility]2023  2:00 [facility]   1/23/2023  2:00 PM [facility]   [facility]25[facility]2023  [facility]00 [facility]   2/1/2023  2:00 PM [facility]

## 2023-01-18 ENCOUNTER — HOSPITAL ENCOUNTER (OUTPATIENT)
Dept: PHYSICAL THERAPY | Age: 88
Setting detail: RECURRING SERIES
Discharge: HOME | End: 2023-01-21
Payer: MEDICARE

## 2023-01-18 PROCEDURE — 97110 THERAPEUTIC EXERCISES: CPT

## 2023-01-18 NOTE — PROGRESS NOTES
Rey Grande  : 1935  Primary: Medicare Part A And B  Secondary: Vu Mars @ [address]  [facility] [address]  Phone: [phone]  Fax: [phone] Plan Frequency: 2x per week  Plan of Care/Certification Expiration Date: 23      PT Visit Info:  No data recorded   Visit Count:  10   OUTPATIENT PHYSICAL DWEHRTO:RX NOTE TYPE: Treatment Note  2023       Episode  }Appt Desk             Treatment Diagnosis:  Generalized Muscle Weakness (M62.81)  Difficulty in walking, Not elsewhere classified (R26.2)  Other abnormalities of gait and mobility (R26.89)  Medical/Referring Diagnosis:  Weakness [R53.1]  Referring Physician:  Tito Moran MD MD Orders:  PT Eval and Treat   Date of Onset:  No data recorded   Allergies:   Patient has no known allergies. Restrictions/Precautions:  No data recorded  No data recorded   Interventions Planned (Treatment may consist of any combination of the following):    Current Treatment Recommendations: Strengthening; Balance training; Functional mobility training; Transfer training; ADL/Self-care training; Gait training; Stair training; Neuromuscular re-education; Home exercise program; Therapeutic activities     Subjective Comments:  Pt did not report pain or fatigue today.      Initial:}     0/10Post Session:        0/10   Medications Last Reviewed:  2023  Updated Objective Findings: PN 23   Observation  Kyphotic posture  Increased forward trunk lean with ambulation, decreased stride length     Strength (R/L)   Hip flexion                   4+/4+  Knee extension           5/4+  Knee flexion                4+/4+  Ankle DF                     4+/4+  Ankle PF(MMT)           5/5  Ankle eversion            4+/4+    Hip ABD                      4/4-     Able to heel and toe step in place with hands on // bars for balance     Special Tests  TUG 9.6 seconds   30 second STS: 3 without use of hands  Feet together EO: L fwd: 16 seconds, R fwd: 10 seconds  Feet together EC: 5 seconds  Treatment   TREATMENT:   THERAPEUTIC ACTIVITY: ( see below for minutes): Therapeutic activities per grid below to improve mobility, strength, balance and coordination. Required minimal visual, verbal, manual and tactile cues to improve independence and safety with daily activities . THERAPEUTIC EXERCISE: (see below for minutes): Exercises per grid below to improve mobility, strength, balance and coordination. Required minim1/18/23al verbal and manual cues to promote proper body alignment, promote proper body posture and promote proper body mechanics. Progressed resistance, range, repetitions and complexity of movement as indicated. MANUAL THERAPY: (see below for minutes): Joint mobilization and Soft tissue mobilization was utilized and necessary because of the patient's restricted joint motion, painful spasm, loss of articular motion and restricted motion of soft tissue. MODALITIES: (see below for minutes): to decrease pain   SELF CARE: (see below for minutes): Procedure(s) (per grid) utilized to improve and/or restore self-care/home management as related to dressing, bathing and grooming.  Required minimal verbal cueing to facilitate activities of daily living skills and compensatory activities    Date: 12/12/22 (visit 3) 12/14/22 (visit 4) 12/20/22 (visit 5) 1/4/23 (visit 6) 1/6/23 (visit 7) 1/9/23 (visit 8) 1/16/23 (visit 9) PN 1/18/23 (visit 10)   Modalities:                                            Therapeutic Exercise: 45 min 40 min 40 min 45 min 45 min 45 min 42 min 45 min   stepper    L5, 5 min L5, 5 min L7 5 min            Feet together EC, air ex  3x30\" Feet together EC x 1 min hold on foam    Wide stance 1 min hold on foam EC          Tandem balance EO, firm  3x30\"  Tandem EO on foam 30 sec hold x 4 no UE support          Standing hip ABD, on air ex  3x10 B           Air ex stance, B scaption raise  1x10  4#           Small range KB deadlift  3x10  15# Squat lift from low mat table 8# med ball x10 and 5# kettle bell from floor x10          Marching in // bars  5 laps Marching slowly in // bars, 4 laps    Supine hamstring stretch with strap 30 sec hold x 4 B D2 shoulder flexion/ext with red band 3x10 B UE Bridge  3x15 Hamstring stretch 30 sec hold x 4 B with strap  Hamstring stretch 30 sec hold x 4 B with strap      SLR x10 B  Marching in // bars over hurdles  4 laps fwd  4 laps lat Marching in //bars without UE support x 4 laps Marching x 4 laps in //bars, no UE support Lower trunk rotation stretch 10 sec hold x 10 B        STS chair/2 air ex  4x10 Sit to stand 3x10 with airex pad and no UE support Sit to stand with UE support from chair, 3x10 Sit to stand, no UE support, successively lower mat table  Sit to stand with 8# med ball and foam pad in chair    Bridge 2x10  Step ups  3x10 ea  10\" box, hand held assist  Step ups x15 B onto 10 in with one hand on rail Step ups forward x15 B onto 10 in box without UE support X15 B onto 10 in box with one hand on bar      Slant board stretch 1 min hold on level 1  Tandem balance  Firm  2x30\" ea   Tandem walk x 3 laps in //bar with one hand on bar         Standing balance with eyes closed 1 min hold x 2 Standing balance EO and EC x 1 min ea on foam, no UE support Standing balance eyes closed on foam 1 min hold x 2 with CGA     Walking backwards 4 laps in // bars with UE support Forward/back with blue band x 3 laps with one hand on bar and lateral walk with blue band x 3 laps no UE support  Forward/back with blue band x 4 laps in //bars with intermittent UE support  Bridges x10 supine  Bridges 3x10 supine    High knee marching in //bars, no UE support x 4 laps High knee marching in // bars with one hand on bar  Mini squats with 7.5# kettle bell to mat table    B SLR 3x10   Single leg stance     30 sec hold x 4 B with UE support 30 sec hold x 4 B with one hand on bar  30 sec hold x 6 B with one hand    Cobble stone floor  Narrow stance eyes closed 1 min hold x 2 (intermittent use of hands)    Modified tandem stance 30 sec hold x 4 B with UE support Obstacle course:    6 in step, 3 hurdles and cobble stone floor. 2 laps forward, 3 laps forward with 5# kettlebell, 2 laps latera with CGA to SBA      Obstacle course: 6 inch step, cobble stone, and foam pad. SBA to CGA  Once no box and one lap carry empty box    Tandem walk x 4 laps with one hand on bar    Heel raises no UE support x30 X30 heel raise no UE support  X30 heel raises no support   Sit to stand 3x10 with one airex pad and no UE support X10 on pad and x10 with 5# kettle bell   Slant board stretch 1 min hold on level 1 1 min hold on level 1 on incline     Step ups  X10 leading with R onto 10 in box  2x10 each LE onto 6 in box with one hand on bar   Forward up and over the foam pad with 7.5# priyanka bell, SBA      Proprioceptive Activities:                                            Manual Therapy:                                 Functional Activities:                                                       Access Code: [email or web address]  URL: [email or web address]  Date: 12/05/2022  Prepared by: Kierra Clamp    Exercises  Supine Bridge - 1 x daily - 7 x weekly - 3 sets - 10 reps  Supine Active Straight Leg Raise - 1 x daily - 7 x weekly - 3 sets - 10 reps  Sidelying Hip Abduction - 1 x daily - 7 x weekly - 3 sets - 10 reps      Treatment/Session Summary:    Treatment Assessment:  Patient reported B hamstring weakness with sit to stands and had mild quadriceps weakness with step ups. Overall he is doing well with balance and functional activities. Carry an object during the obstacle course was challenging for him.   Communication/Consultation:  None today  Equipment provided today:  None  Recommendations/Intent for next treatment session: Next visit will focus on balance activity progressions toward functional ADLs.    Total Treatment Billable Duration:  45 minutes   Time In: 1400  Time Out: 1445    Suki Claros PTA       Charge Capture  }Post Session Pain  PT Visit Info  [email or web address] Portal  MD Guidelines  Scanned Media  Benefits  MyChart    Future Appointments   Date Time Provider Department Center   1/23/2023  2:00 PM [facility]   1/25/2023  2:00 PM Sadiq Sethi, PT [facility]   2/1/2023  2:00 PM Suki Claros PTA [facility]

## 2023-01-23 ENCOUNTER — HOSPITAL ENCOUNTER (OUTPATIENT)
Dept: PHYSICAL THERAPY | Age: 88
Setting detail: RECURRING SERIES
Discharge: HOME | End: 2023-01-26
Payer: MEDICARE

## 2023-01-23 PROCEDURE — 97110 THERAPEUTIC EXERCISES: CPT

## 2023-01-23 NOTE — PROGRESS NOTES
[place]y Grande  : 1935  Primary: Medicare Part A And B  Secondary: [address] @ [address]  [facility] [address]  Phone: [phone]  Fax: [phone] Plan Frequency: 2x per week  Plan of Care/Certification Expiration Date: 23      PT Visit Info:  No data recorded   Visit Count:  11   OUTPATIENT PHYSICAL THERAPY:OP NOTE TYPE: Treatment Note  2023       Episode  }Appt Desk             Treatment Diagnosis:  Generalized Muscle Weakness (M62.81)  Difficulty in walking, Not elsewhere classified (R26.2)  Other abnormalities of gait and mobility (R26.89)  Medical/Referring Diagnosis:  Weakness [R53.1]  Referring Physician:  Deuce Flaherty MD MD Orders:  PT Eval and Treat   Date of Onset:  No data recorded   Allergies:   Patient has no known allergies. Restrictions/Precautions:  No data recorded  No data recorded   Interventions Planned (Treatment may consist of any combination of the following):    Current Treatment Recommendations: Strengthening; Balance training; Functional mobility training; Transfer training; ADL/Self-care training; Gait training; Stair training; Neuromuscular re-education; Home exercise program; Therapeutic activities     Subjective Comments:  Pt did not report pain or fatigue today.      Initial:}     0/10Post Session:        0/10   Medications Last Reviewed:  2023  Updated Objective Findings: PN 23   Observation  Kyphotic posture  Increased forward trunk lean with ambulation, decreased stride length     Strength (R/L)   Hip flexion                   4+/4+  Knee extension           5/4+  Knee flexion                4+/4+  Ankle DF                     4+/4+  Ankle PF(MMT)           5/5  Ankle eversion            4+/4+    Hip ABD                      4/4-     Able to heel and toe step in place with hands on // bars for balance     Special Tests  TUG 9.6 seconds   30 second STS: 3 without use of hands  Feet together EO: L fwd: 16 seconds, R fwd: 10 seconds  Feet together EC: 5 seconds  Treatment   TREATMENT:   THERAPEUTIC ACTIVITY: ( see below for minutes): Therapeutic activities per grid below to improve mobility, strength, balance and coordination. Required minimal visual, verbal, manual and tactile cues to improve independence and safety with daily activities . THERAPEUTIC EXERCISE: (see below for minutes): Exercises per grid below to improve mobility, strength, balance and coordination. Required minim1/18/23al verbal and manual cues to promote proper body alignment, promote proper body posture and promote proper body mechanics. Progressed resistance, range, repetitions and complexity of movement as indicated. MANUAL THERAPY: (see below for minutes): Joint mobilization and Soft tissue mobilization was utilized and necessary because of the patient's restricted joint motion, painful spasm, loss of articular motion and restricted motion of soft tissue. MODALITIES: (see below for minutes): to decrease pain   SELF CARE: (see below for minutes): Procedure(s) (per grid) utilized to improve and/or restore self-care/home management as related to dressing, bathing and grooming.  Required minimal verbal cueing to facilitate activities of daily living skills and compensatory activities    Date: 12/12/22 (visit 3) 12/14/22 (visit 4) 12/20/22 (visit 5) 1/4/23 (visit 6) 1/6/23 (visit 7) 1/9/23 (visit 8) 1/16/23 (visit 9) PN 1/18/23 (visit 10) 1/23/23 (visit 11)   Modalities:                                                Therapeutic Exercise: 45 min 40 min 40 min 45 min 45 min 45 min 42 min 45 min 45 min   stepper    L5, 5 min L5, 5 min L7 5 min             Feet together EC, air ex  3x30\" Feet together EC x 1 min hold on foam    Wide stance 1 min hold on foam EC Feet together eyes closed x 30 sec    Wide stance EC x 30 sec          Tandem balance EO, firm  3x30\"  Tandem EO on foam 30 sec hold x 4 no UE support Modified tandem 30 sec hold x 6 B EC, intermittent  UE support          Standing hip ABD, on air ex  3x10 B  Tandem walk x 4 laps in // bars          Air ex stance, B scaption raise  1x10  4#            Small range KB deadlift  3x10  15# Squat lift from low mat table 8# med ball x10 and 5# kettle bell from floor x10 3x10 sit to stand with one pad in chair          Marching in // bars  5 laps Marching slowly in // bars, 4 laps Marching slowly in // bars, 4 laps    Supine hamstring stretch with strap 30 sec hold x 4 B D2 shoulder flexion/ext with red band 3x10 B UE Bridge  3x15 Hamstring stretch 30 sec hold x 4 B with strap  Hamstring stretch 30 sec hold x 4 B with strap   Lateral band walk in // bars x 4 laps- green perform better band    SLR x10 B  Marching in // bars over hurdles  4 laps fwd  4 laps lat Marching in //bars without UE support x 4 laps Marching x 4 laps in //bars, no UE support Lower trunk rotation stretch 10 sec hold x 10 B         STS chair/2 air ex  4x10 Sit to stand 3x10 with airex pad and no UE support Sit to stand with UE support from chair, 3x10 Sit to stand, no UE support, successively lower mat table  Sit to stand with 8# med ball and foam pad in chair     Bridge 2x10  Step ups  3x10 ea  10\" box, hand held assist  Step ups x15 B onto 10 in with one hand on rail Step ups forward x15 B onto 10 in box without UE support X15 B onto 10 in box with one hand on bar       Slant board stretch 1 min hold on level 1  Tandem balance  Firm  2x30\" ea   Tandem walk x 3 laps in //bar with one hand on bar          Standing balance with eyes closed 1 min hold x 2 Standing balance EO and EC x 1 min ea on foam, no UE support Standing balance eyes closed on foam 1 min hold x 2 with CGA      Walking backwards 4 laps in // bars with UE support Forward/back with blue band x 3 laps with one hand on bar and lateral walk with blue band x 3 laps no UE support  Forward/back with blue band x 4 laps in //bars with intermittent UE support  Bridges x10 supine  Bridges 3x10 supine     High knee marching in //bars, no UE support x 4 laps High knee marching in // bars with one hand on bar  Mini squats with 7.5# priyanka bell to mat table    B SLR 3x10    Single leg stance     30 sec hold x 4 B with UE support 30 sec hold x 4 B with one hand on bar  30 sec hold x 6 B with one hand     Cobble stone floor  Narrow stance eyes closed 1 min hold x 2 (intermittent use of hands)    Modified tandem stance 30 sec hold x 4 B with UE support Obstacle course:    6 in step, 3 hurdles and cobble stone floor. 2 laps forward, 3 laps forward with 5# kettlebell, 2 laps latera with CGA to SBA      Obstacle course: 6 inch step, cobble stone, and foam pad. SBA to CGA  Once no box and one lap carry empty box Obstacle course: 6 inch step, cobble stone and foam pad. X3 laps carry box with various weights, CGA    Tandem walk x 4 laps with one hand on bar    Heel raises no UE support x30 X30 heel raise no UE support  X30 heel raises no support X30 heel raises no support   Sit to stand 3x10 with one airex pad and no UE support X10 on pad and x10 with 5# kettle bell   Slant board stretch 1 min hold on level 1 1 min hold on level 1 on incline      Step ups  X10 leading with R onto 10 in box  2x10 each LE onto 6 in box with one hand on bar   Forward up and over the foam pad with 7.5# kettle bell, SBA       Proprioceptive Activities:                                                Manual Therapy:                                    Functional Activities:                                                            Access Code: [email or web address]  URL: [email or web address]  Date: 12/05/2022  Prepared by: Yaa Kolb  Supine Bridge - 1 x daily - 7 x weekly - 3 sets - 10 reps  Supine Active Straight Leg Raise - 1 x daily - 7 x weekly - 3 sets - 10 reps  Sidelying Hip Abduction - 1 x daily - 7 x weekly - 3 sets - 10 reps      Treatment/Session Summary:    Treatment Assessment:  Patient had difficulty with eyes closed activities and reported mild fatigue. Communication/Consultation:  None today  Equipment provided today:  None  Recommendations/Intent for next treatment session: Next visit will focus on balance activity progressions toward functional ADLs.     Total Treatment Billable Duration:  45 minutes   Time In: 1400  Time Out: [address], [facility]       Charge Capture  }Post Session Pain  PT Visit Info  [employer] Portal  MD Guidelines  Scanned Media  Benefits  MyChart    Future Appointments   Date Time Provider Heena Carey   1/25/2023  2:00 PM Shena Chi, [facility]   2/1/2023  2:00 PM [facility]

## 2023-01-25 ENCOUNTER — HOSPITAL ENCOUNTER (OUTPATIENT)
Dept: PHYSICAL THERAPY | Age: 88
Setting detail: RECURRING SERIES
End: 2023-01-25
Payer: MEDICARE

## 2023-01-25 NOTE — PROGRESS NOTES
OUTPATIENT DAILY NOTE    NAME/AGE/GENDER: Shyanne Clark is a 80 y.o. male. DATE: 1/25/2023    Mr. Christine Lou for today's appointment due to conflicting MD appointment .     Amadou Bell, PT

## 2024-01-31 ENCOUNTER — APPOINTMENT (OUTPATIENT)
Dept: CT IMAGING | Age: 89
End: 2024-01-31
Payer: MEDICARE

## 2024-01-31 ENCOUNTER — APPOINTMENT (OUTPATIENT)
Dept: GENERAL RADIOLOGY | Age: 89
End: 2024-01-31
Payer: MEDICARE

## 2024-01-31 ENCOUNTER — HOSPITAL ENCOUNTER (EMERGENCY)
Age: 89
Discharge: HOME | End: 2024-01-31
Payer: MEDICARE

## 2024-01-31 VITALS
SYSTOLIC BLOOD PRESSURE: 148 MMHG | WEIGHT: 164 LBS | HEART RATE: 74 BPM | DIASTOLIC BLOOD PRESSURE: 74 MMHG | OXYGEN SATURATION: 95 % | RESPIRATION RATE: 16 BRPM | TEMPERATURE: 98.1 F | HEIGHT: 70 IN | BODY MASS INDEX: 23.48 KG/M2

## 2024-01-31 DIAGNOSIS — M50.30 DDD (DEGENERATIVE DISC DISEASE), CERVICAL: ICD-10-CM

## 2024-01-31 DIAGNOSIS — S22.050D COMPRESSION FRACTURE OF T5 VERTEBRA WITH ROUTINE HEALING, SUBSEQUENT ENCOUNTER: ICD-10-CM
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
## 2024-01-31 DIAGNOSIS — M51.36 DDD (DEGENERATIVE DISC DISEASE), LUMBAR: ICD-10-CM

## 2024-01-31 DIAGNOSIS — S20.212A RIB CONTUSION, LEFT, INITIAL ENCOUNTER: ICD-10-CM

## 2024-01-31 DIAGNOSIS — S22.060A COMPRESSION FRACTURE OF T7 VERTEBRA, INITIAL ENCOUNTER (HCC): ICD-10-CM

## 2024-01-31 PROCEDURE — 72070 X-RAY EXAM THORAC SPINE 2VWS: CPT

## 2024-01-31 PROCEDURE — 99284 EMERGENCY DEPT VISIT MOD MDM: CPT

## 2024-01-31 PROCEDURE — 72125 CT NECK SPINE W/O DYE: CPT

## 2024-01-31 PROCEDURE — 70450 CT HEAD/BRAIN W/O DYE: CPT

## 2024-01-31 PROCEDURE — 71046 X-RAY EXAM CHEST 2 VIEWS: CPT

## 2024-01-31 PROCEDURE — 72128 CT CHEST SPINE W/O DYE: CPT

## 2024-01-31 PROCEDURE — 72100 X-RAY EXAM L-S SPINE 2/3 VWS: CPT

## 2024-01-31 RX ORDER — TRAMADOL HYDROCHLORIDE 50 MG/1
50 TABLET ORAL EVERY 6 HOURS PRN
Qty: 12 TABLET | Refills: 0 | Status: SHIPPED | OUTPATIENT
Start: 2024-01-31 | End: 2024-02-03

## 2024-01-31 RX ORDER — LIDOCAINE 50 MG/G
1 PATCH TOPICAL DAILY
Qty: 14 PATCH | Refills: 0 | Status: SHIPPED | OUTPATIENT
Start: 2024-01-31 | End: 2024-02-14

## 2024-01-31 ASSESSMENT — PAIN - FUNCTIONAL ASSESSMENT: PAIN_FUNCTIONAL_ASSESSMENT: 0-10

## 2024-01-31 ASSESSMENT — PAIN DESCRIPTION - LOCATION: LOCATION: BACK

## 2024-01-31 ASSESSMENT — LIFESTYLE VARIABLES
HOW OFTEN DO YOU HAVE A DRINK CONTAINING ALCOHOL: NEVER
HOW MANY STANDARD DRINKS CONTAINING ALCOHOL DO YOU HAVE ON A TYPICAL DAY: PATIENT DOES NOT DRINK

## 2024-01-31 ASSESSMENT — PAIN SCALES - GENERAL: PAINLEVEL_OUTOF10: 6

## 2024-01-31 NOTE — ED TRIAGE NOTES
Patient arrives to ED pov from home. Patient reports his legs cramped up and he fell. Denies hitting head. Denies LOC. Patient reports he is on blood thinners. Patient reports pain to his back.

## 2024-01-31 NOTE — ED NOTES
Unable to obtain discharge vitals due to patient wanting to wait in the car. Discharge instructions explained to patients wife.      Brittany Hagan, ANITA  01/31/24 [id number]

## 2024-01-31 NOTE — DISCHARGE INSTRUCTIONS
Evaluated in the emergency department today after fall    X-rays negative for any rib fractures  I have written you a prescription for lidocaine patch to wear for 12 hours then go 12 hours without wearing patch of your pain on your left ribs  Take big deep breaths to help expand your ribs    CT of your neck is negative for any fracture or dislocation.  You do have some chronic degenerative changes within your cervical spine which can be expected as we age    CT of your head is negative for any bleed or fracture of the skull or any acute abnormality    CT of your thoracic spine demonstrates a new compression fracture at T7 however this is not new according to today as it looks to be healing on CT imaging    Regular x-ray of your thoracic back demonstrated a possible new compression fracture however we ruled that out with CT imaging today    X-ray of your lumbar spine demonstrates some degenerative disc disease without any fracture or dislocation.  I have put in an urgent referral to orthospine for you for your compression fractures and your degenerative changes in your spine      Contact your primary care provider tomorrow to schedule follow-up within the next 2 weeks I have written you a prescription for tramadol 50 mg to be used conservatively for breakthrough pain        Return to the emergency department for sudden onset \"worst headache of your life\", typically accompanied by vision changes and vomiting, chest pain, signs and symptoms of stroke as listed in your discharge paper

## 2024-01-31 NOTE — ED NOTES
I have reviewed discharge instructions with the spouse.  The spouse verbalized understanding.    Patient left ED via Discharge Method: ambulatory to Home with spouse    Opportunity for questions and clarification provided.       Patient given 2 scripts.         To continue your aftercare when you leave the hospital, you may receive an automated call from our care team to check in on how you are doing.  This is a free service and part of our promise to provide the best care and service to meet your aftercare needs.” If you have questions, or wish to unsubscribe from this service please call [phone].  Thank you for Choosing our [facility] Emergency Department.       Brittany Hagan, ANITA  01/31/24 [id number]

## 2024-01-31 NOTE — ED PROVIDER NOTES
reconstruction.    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 Severe small vessel ischemia and atrophy are demonstrated.  There is no CT evidence of acute hemorrhage or infarction. Significant  atherosclerotic disease is seen in the intracranial vasculature. The ventricles  are normal in size. There are no extra-axial fluid collections. No masses are  seen. The sinuses are clear. There are no bony lesions.      Impression    No CT evidence of acute intracranial abnormality.    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   CT CERVICAL SPINE WO CONTRAST    Narrative    CT of the Cervical Spine    INDICATION: Fall, trauma.    TECHNIQUE: [id number]  axial images were obtained through the cervical spine  without intravenous contrast. Coronal and sagittal reformatted images were also  reviewed.  Radiation dose reduction techniques were used for this study:  All CT  scans performed at this facility use one or all of the following: Automated  exposure control, adjustment of the mA and/or kVp according to patient's size,  iterative reconstruction.    COMPARISON: None    FINDINGS:  There is no evidence of fracture or subluxation. Moderate degenerative disc  disease is seen throughout the mid cervical spine. No bony lesions are seen.   There is no prevertebral soft tissue swelling.      Impression    No acute abnormality noted in cervical spine     CT THORACIC SPINE WO CONTRAST    Narrative    CT THORACIC SPINE WO CONTRAST - 1/31/2024 5:11 PM - [id number]    COMPARISON: 1/31/2024 thoracic spine radiographs. 6/20/2021 chest radiograph    INDICATION: New 90% compression deformity at T7 noted on plain imaging.      TECHNIQUE:  Multiple axial images were obtained through the thoracic spine without  intravenous contrast. Coronal and sagittal reformatted images were

## 2024-02-06 ENCOUNTER — OFFICE VISIT (OUTPATIENT)
Dept: ORTHOPEDIC SURGERY | Age: 89
End: 2024-02-06
Payer: MEDICARE

## 2024-02-06 DIAGNOSIS — S22.050S: Primary | ICD-10-CM

## 2024-02-06 DIAGNOSIS — M40.14 OTHER SECONDARY KYPHOSIS, THORACIC REGION: ICD-10-CM

## 2024-02-06 PROCEDURE — 4004F PT TOBACCO SCREEN RCVD TLK: CPT | Performed by: PHYSICIAN ASSISTANT

## 2024-02-06 PROCEDURE — G8420 CALC BMI NORM PARAMETERS: HCPCS | Performed by: PHYSICIAN ASSISTANT

## 2024-02-06 PROCEDURE — G8484 FLU IMMUNIZE NO ADMIN: HCPCS | Performed by: PHYSICIAN ASSISTANT

## 2024-02-06 PROCEDURE — 99204 OFFICE O/P NEW MOD 45 MIN: CPT | Performed by: PHYSICIAN ASSISTANT

## 2024-02-06 PROCEDURE — G8427 DOCREV CUR MEDS BY ELIG CLIN: HCPCS | Performed by: PHYSICIAN ASSISTANT

## 2024-02-06 PROCEDURE — 1123F ACP DISCUSS/DSCN MKR DOCD: CPT | Performed by: PHYSICIAN ASSISTANT

## 2024-02-06 RX ORDER — ONDANSETRON 8 MG/1
8 TABLET, ORALLY DISINTEGRATING ORAL 4 TIMES DAILY PRN
COMMUNITY
Start: 2024-02-01

## 2024-02-06 RX ORDER — LIDOCAINE 50 MG/G
1 PATCH TOPICAL DAILY
COMMUNITY
Start: 2023-04-24

## 2024-02-06 RX ORDER — TERIPARATIDE 250 UG/ML
20 INJECTION, SOLUTION SUBCUTANEOUS DAILY
COMMUNITY
Start: 2024-01-19 | End: 2024-04-18

## 2024-02-06 RX ORDER — APIXABAN 5 MG/1
TABLET, FILM COATED ORAL
COMMUNITY
Start: 2023-12-27

## 2024-02-06 RX ORDER — BISACODYL 10 MG
10 SUPPOSITORY, RECTAL RECTAL DAILY PRN
COMMUNITY
Start: 2023-04-24

## 2024-02-06 RX ORDER — METOPROLOL SUCCINATE 25 MG/1
25 TABLET, EXTENDED RELEASE ORAL DAILY
COMMUNITY
Start: 2023-11-29 | End: 2024-11-23

## 2024-02-06 NOTE — PROGRESS NOTES
An order for a Nuclear Bone scan has been placed.  
cement with the hardness of the osteoporotic adjacent vertebrae. This could theoretically predispose an adjacent level fracture.     Recommmend Nuclear bone scan to assess fracture acuity. He cannot have MRI due to pacemaker. If fracture is old, recommend referral to pain management.    4 This is a chronic illness/condition with exacerbation and progression    No orders of the defined types were placed in this encounter.       Orders Placed This Encounter   Procedures    NM BONE SCAN WHOLE BODY          Return for after nuclear bone scan.     Carol Nj PA-C  02/06/24      Elements of this note were created using speech recognition software.  As such, errors of speech recognition may be present.

## 2024-02-22 ENCOUNTER — TELEPHONE (OUTPATIENT)
Dept: ORTHOPEDIC SURGERY | Age: 89
End: 2024-02-22

## 2024-02-22 NOTE — TELEPHONE ENCOUNTER
Please call patient , needs to get set up , they do want to do it, the daughter answered the phone and cx she thinks? But He does want to get done

## (undated) DEVICE — SUT ETHLN 2-0 18IN FS BLK --

## (undated) DEVICE — SUTURE VCRL + SZ 3-0 L18IN ABSRB UD SH 1/2 CIR TAPERCUT NDL VCP864D

## (undated) DEVICE — COVER US PRB W12XL244CM FLD IORT STR TIP

## (undated) DEVICE — DRESSING,GAUZE,XEROFORM,CURAD,5"X9",ST: Brand: CURAD

## (undated) DEVICE — BUTTON SWITCH PENCIL BLADE ELECTRODE, HOLSTER: Brand: EDGE

## (undated) DEVICE — 2000CC GUARDIAN II: Brand: GUARDIAN

## (undated) DEVICE — SOLUTION IV 1000ML 0.9% SOD CHL

## (undated) DEVICE — GAUZE,SPONGE,4"X4",16PLY,STRL,LF,10/TRAY: Brand: MEDLINE

## (undated) DEVICE — APPLIER CLP L9.38IN M LIG TI DISP STR RNG HNDL LIGACLP

## (undated) DEVICE — STRIP,CLOSURE,WOUND,MEDI-STRIP,1/2X4: Brand: MEDLINE

## (undated) DEVICE — MASTISOL ADHESIVE LIQ 2/3ML

## (undated) DEVICE — REM POLYHESIVE ADULT PATIENT RETURN ELECTRODE: Brand: VALLEYLAB

## (undated) DEVICE — DRSG FOAM MEPILEX TRNSF 6X8IN --

## (undated) DEVICE — DERMATOME BLADES: Brand: DERMATOME

## (undated) DEVICE — INTENDED FOR TISSUE SEPARATION, AND OTHER PROCEDURES THAT REQUIRE A SHARP SURGICAL BLADE TO PUNCTURE OR CUT.: Brand: BARD-PARKER SAFETY BLADES SIZE 15, STERILE

## (undated) DEVICE — SUT ETHLN 3-0 18IN PS1 BLK --

## (undated) DEVICE — DRSG GZ PETROLATM OCL 3X18IN -- CURAD

## (undated) DEVICE — SURGICAL PROCEDURE PACK BASIC ST FRANCIS

## (undated) DEVICE — MINOR SPLIT GENERAL: Brand: MEDLINE INDUSTRIES, INC.

## (undated) DEVICE — SHEET, DRAPE, SPLIT, STERILE: Brand: MEDLINE

## (undated) DEVICE — T-DRAPE,EXTREMITY,STERILE: Brand: MEDLINE

## (undated) DEVICE — TRAY PREP DRY W/ PREM GLV 2 APPL 6 SPNG 2 UNDPD 1 OVERWRAP

## (undated) DEVICE — PAD,NON-ADHERENT,3X8,STERILE,LF,1/PK: Brand: MEDLINE

## (undated) DEVICE — SUTURE VCRL SZ 3-0 L18IN ABSRB UD L26MM SH 1/2 CIR J864D

## (undated) DEVICE — 3M™ TEGADERM™ TRANSPARENT FILM DRESSING FRAME STYLE, 1626W, 4 IN X 4-3/4 IN (10 CM X 12 CM), 50/CT 4CT/CASE: Brand: 3M™ TEGADERM™

## (undated) DEVICE — DRAPE TWL SURG 16X26IN BLU ORB04] ALLCARE INC]

## (undated) DEVICE — DRAPE,TOP,102X53,STERILE: Brand: MEDLINE

## (undated) DEVICE — Device

## (undated) DEVICE — BANDAGE,GAUZE,BULKEE II,4.5"X4.1YD,STRL: Brand: MEDLINE

## (undated) DEVICE — AMD ANTIMICROBIAL GAUZE SPONGES,12 PLY USP TYPE VII, 0.2% POLYHEXAMETHYLENE BIGUANIDE HCI (PHMB): Brand: CURITY